# Patient Record
Sex: FEMALE | Race: WHITE | Employment: FULL TIME | URBAN - METROPOLITAN AREA
[De-identification: names, ages, dates, MRNs, and addresses within clinical notes are randomized per-mention and may not be internally consistent; named-entity substitution may affect disease eponyms.]

---

## 2018-08-07 ENCOUNTER — OFFICE VISIT (OUTPATIENT)
Dept: OBGYN CLINIC | Age: 48
End: 2018-08-07

## 2018-08-07 VITALS
SYSTOLIC BLOOD PRESSURE: 102 MMHG | BODY MASS INDEX: 29.47 KG/M2 | HEIGHT: 64 IN | WEIGHT: 172.6 LBS | DIASTOLIC BLOOD PRESSURE: 64 MMHG

## 2018-08-07 DIAGNOSIS — L90.0 LICHEN SCLEROSUS: ICD-10-CM

## 2018-08-07 DIAGNOSIS — Z12.31 SCREENING MAMMOGRAM, ENCOUNTER FOR: ICD-10-CM

## 2018-08-07 DIAGNOSIS — Z11.51 SCREENING FOR HUMAN PAPILLOMAVIRUS: ICD-10-CM

## 2018-08-07 DIAGNOSIS — Z01.419 WELL WOMAN EXAM: Primary | ICD-10-CM

## 2018-08-07 RX ORDER — CITALOPRAM 20 MG/1
TABLET, FILM COATED ORAL
COMMUNITY
Start: 2018-05-24 | End: 2018-09-04 | Stop reason: SDUPTHER

## 2018-08-07 NOTE — PROGRESS NOTES
Annual exam    Keyona Tilley is a 52 y.o.  A1 presenting for annual exam. Her main concerns today include dyspareunia and a history of lichen sclerosis (clinical diagnosis, never biopsied, previously followed by Grazyna Blackwell MD). Pt reports symptoms were not improved with clobetasol or topical estrogen. Improving on its own now with dietary changes she has made (due to gluten intolerance). Previously on OCP's for heavy menses. Began to have mid-cycle BTB, thus she stopped her OCPs 6 months ago, for the last couple of months her menstrual cycles have been normal with no BTB. Does not wish to restart at this time. Ob/Gyn Hx:   A1 - 2 c-sections,  &   LMP- 18  Menses- regular monthly cycles? yes, last 5 days, passage of clots? yes, intermenstrual spotting? yes, previously, not in the last 2 months Number of pads/tampons per day? 5  Contraception- withdrawal methods, condoms occasionally  STI- denies  ? SA- yes    Health maintenance:  Pap- 10/2016, normal per patient, Dr Grazyna Blackwell, denies h/o abnl aps  Mammo- 14 B1  Colonoscopy- not indicated  Gardasil- outside of recommended age    Past Medical History:   Diagnosis Date    Lichen sclerosus        Past Surgical History:   Procedure Laterality Date    HX  SECTION  ,        Family History   Problem Relation Age of Onset    No Known Problems Mother     Diabetes Father     Hypertension Father     Breast Cancer Paternal Aunt      early 52's, 2 occurances    Colon Cancer Maternal Grandmother     Colon Cancer Maternal Grandfather        Social History     Social History    Marital status: UNKNOWN     Spouse name: N/A    Number of children: N/A    Years of education: N/A     Occupational History    Not on file.      Social History Main Topics    Smoking status: Former Smoker    Smokeless tobacco: Never Used      Comment: quit 20 years ago    Alcohol use Yes      Comment: 4 drinks per week    Drug use: No    Sexual activity: Yes     Partners: Male     Birth control/ protection: Condom, None     Other Topics Concern    Not on file     Social History Narrative    No narrative on file       Current Outpatient Prescriptions   Medication Sig Dispense Refill    citalopram (CELEXA) 20 mg tablet          Allergies   Allergen Reactions    Codeine Nausea and Vomiting       Review of Systems - History obtained from the patient  Constitutional: negative for weight loss, fever, night sweats  HEENT: negative for hearing loss, earache, congestion, snoring, sorethroat  CV: negative for chest pain, palpitations, edema  Resp: negative for cough, shortness of breath, wheezing  GI: negative for change in bowel habits, abdominal pain, black or bloody stools  : negative for frequency, dysuria, hematuria, vaginal discharge, +dyspareunia  MSK: negative for back pain, joint pain, muscle pain  Breast: negative for breast lumps, nipple discharge, galactorrhea  Skin :negative for itching, rash, hives  Neuro: negative for dizziness, headache, confusion, weakness  Psych: negative for anxiety, depression, change in mood  Heme/lymph: negative for bleeding, bruising, pallor    Physical Exam    Visit Vitals    /64 (BP 1 Location: Left arm, BP Patient Position: Sitting)    Ht 5' 3.5\" (1.613 m)    Wt 172 lb 9.6 oz (78.3 kg)    LMP 07/20/2018 (Exact Date)    BMI 30.1 kg/m2       Constitutional  · Appearance: well-nourished, well developed, alert, in no acute distress    HENT  · Head and Face: appears normal    Neck  · Inspection/Palpation: normal appearance, no masses or tenderness  · Lymph Nodes: no lymphadenopathy present  · Thyroid: gland size normal, nontender, no nodules or masses present on palpation    Chest  · Respiratory Effort: non-labored breathing  · Auscultation: CTAB, normal breath sounds    Cardiovascular  · Heart:  · Auscultation: regular rate and rhythm without murmur  · Extremities: no peripheral edema    Breasts  · Inspection of Breasts: breasts symmetrical, no skin changes, no discharge present, nipple appearance normal, no skin retraction present  · Palpation of Breasts and Axillae: no masses present on palpation, no breast tenderness  · Axillary Lymph Nodes: no lymphadenopathy present    Gastrointestinal  · Abdominal Examination: abdomen non-tender to palpation, normal bowel sounds, no masses present  · Liver and spleen: no hepatomegaly present, spleen not palpable  · Hernias: no hernias identified    Genitourinary  · External Genitalia: normal appearance for age, no discharge present, no tenderness present, no inflammatory lesions present, no masses present, +mild atrophy present, +diffuse hypopigmentation/erythema around vulva, perineum, and hemanth-anal area consistent with lichen sclerosus, no discrete lesions           · Vagina: normal vaginal vault without central or paravaginal defects, no discharge present, no inflammatory lesions present, no masses present  · Bladder: non-tender to palpation  · Urethra: appears normal  · Cervix: normal   · Uterus: ? Slightly enlarged, possible fibroid vs. Adnexal mass  · Adnexa: no adnexal tenderness present  · Perineum: perineum within normal limits, no evidence of trauma, as above    Skin  · General Inspection: no rash, no lesions identified    Neurologic/Psychiatric  · Mental Status:  · Orientation: grossly oriented to person, place and time  · Mood and Affect: mood normal, affect appropriate      Assessment/Plan:  52 y.o. Bevely Case presenting for annual exam. Overall doing well.      Health Maintenance:  -counseled re: diet, exercise, healthy lifestyle  -pap/HPV today  -declines STI testing  -refer for mammo  -declines hormonal contraception for cycle control as prior BTB on OCPs   -advised pt keep menstrual calendar and return for further evaluation for any further AUB --> reports prior US with Dr. Cassandra Aquino wnl, has never had EMB    Lichen sclerosus: clinical appearance classic for lichen sclerosus, however, not previously improved with topical steroids or estrogen  -discussed low threshold to biopsy any suspicious areas in the future  -for now would like to continue with dietary changes, as she has noted improvement with these modifications    RTC: 1 year for AE or sooner nicole Mock MD  8/7/2018  3:20 PM

## 2018-08-07 NOTE — PATIENT INSTRUCTIONS
Pap Test: Care Instructions  Your Care Instructions    The Pap test (also called a Pap smear) is a screening test for cancer of the cervix, which is the lower part of the uterus that opens into the vagina. The test can help your doctor find early changes in the cells that could lead to cancer. The sample of cells taken during your test has been sent to a lab so that an expert can look at the cells. It usually takes a week or two to get the results back. Follow-up care is a key part of your treatment and safety. Be sure to make and go to all appointments, and call your doctor if you are having problems. It's also a good idea to know your test results and keep a list of the medicines you take. What do the results mean? · A normal result means that the test did not find any abnormal cells in the sample. · An abnormal result can mean many things. Most of these are not cancer. The results of your test may be abnormal because:  ¨ You have an infection of the vagina or cervix, such as a yeast infection. ¨ You have an IUD (intrauterine device for birth control). ¨ You have low estrogen levels after menopause that are causing the cells to change. ¨ You have cell changes that may be a sign of precancer or cancer. The results are ranked based on how serious the changes might be. There are many other reasons why you might not get a normal result. If the results were abnormal, you may need to get another test within a few weeks or months. If the results show changes that could be a sign of cancer, you may need a test called a colposcopy, which provides a more complete view of the cervix. Sometimes the lab cannot use the sample because it does not contain enough cells or was not preserved well. If so, you may need to have the test again. This is not common, but it does happen from time to time. When should you call for help?   Watch closely for changes in your health, and be sure to contact your doctor if:    · You have vaginal bleeding or pain for more than 2 days after the test. It is normal to have a small amount of bleeding for a day or two after the test.   Where can you learn more? Go to http://alberto-kenney.info/. Enter C553 in the search box to learn more about \"Pap Test: Care Instructions. \"  Current as of: May 12, 2017  Content Version: 11.7  © 3519-7115 [employer]. Care instructions adapted under license by [employer] (which disclaims liability or warranty for this information). If you have questions about a medical condition or this instruction, always ask your healthcare professional. [address] any warranty or liability for your use of this information.

## 2018-08-10 LAB
CYTOLOGIST CVX/VAG CYTO: NORMAL
CYTOLOGY CVX/VAG DOC THIN PREP: NORMAL
DX ICD CODE: NORMAL
HPV I/H RISK 4 DNA CVX QL PROBE+SIG AMP: NEGATIVE
Lab: NORMAL
OTHER STN SPEC: NORMAL
PATH REPORT.FINAL DX SPEC: NORMAL
STAT OF ADQ CVX/VAG CYTO-IMP: NORMAL

## 2018-09-04 ENCOUNTER — OFFICE VISIT (OUTPATIENT)
Dept: OBGYN CLINIC | Age: 48
End: 2018-09-04

## 2018-09-04 VITALS
SYSTOLIC BLOOD PRESSURE: 110 MMHG | RESPIRATION RATE: 18 BRPM | DIASTOLIC BLOOD PRESSURE: 80 MMHG | HEIGHT: 64 IN | BODY MASS INDEX: 28.17 KG/M2 | WEIGHT: 165 LBS

## 2018-09-04 DIAGNOSIS — F32.A DEPRESSION, UNSPECIFIED DEPRESSION TYPE: ICD-10-CM

## 2018-09-04 DIAGNOSIS — R19.00 PELVIC MASS IN FEMALE: Primary | ICD-10-CM

## 2018-09-04 DIAGNOSIS — D25.9 UTERINE LEIOMYOMA, UNSPECIFIED LOCATION: ICD-10-CM

## 2018-09-04 RX ORDER — CITALOPRAM 20 MG/1
20 TABLET, FILM COATED ORAL DAILY
Qty: 90 TAB | Refills: 3 | Status: SHIPPED | OUTPATIENT
Start: 2018-09-04 | End: 2018-12-03

## 2018-09-04 NOTE — PROGRESS NOTES
Annual exam    Brooklynn Young is a 52 y.o.  A1 presenting for follow up of pelvic mass appreciated on recent annual exam. 5cm posterior right lateral fibroid seen on ultrasound today. +dyspareunia and a history of lichen sclerosis (clinical diagnosis, never biopsied, previously followed by Alyssa Reich MD). Pt reports symptoms were not improved with clobetasol or topical estrogen. Improving on its own now with dietary changes she has made (due to gluten intolerance). Previously on OCP's for heavy menses. Began to have mid-cycle BTB, thus she stopped her OCPs 6 months ago, for the last couple of months her menstrual cycles have been normal with no BTB. Does not wish to restart at this time. Pt also requesting refill of citalopram.     Ultrasound 18  THE UTERUS IS ANTEVERTED, NORMAL IN SIZE AND ECHOGENICITY. THERE APPEARS TO BE A RT LAT  INTRAMURAL FIBROID SEEN MEASURING 54 X 47MM. THE ENDOMETRIUM MEASURES 9MM IN THICKNESS. NO MASSES OR ABNORMALITIES ARE SEEN. BILATERAL OVARIES NOT SEEN DUE TO BOWEL GAS. BILATERAL ADNEXAS APPEAR WNL. NO FREE FLUID IS SEEN IN THE CDS. Ob/Gyn Hx:   A1 - 2 c-sections,  &   LMP- 18  Menses- regular monthly cycles? yes, last 5 days, passage of clots? yes, intermenstrual spotting? yes, previously, not in the last 2 months Number of pads/tampons per day? 5  Contraception- withdrawal methods, condoms occasionally  STI- denies  ? SA- yes    Health maintenance:  Pap- 10/2016, normal per patient, Dr Alyssa Reich, denies h/o abnl aps  Mammo- 14 B1  Colonoscopy- not indicated  Gardasil- outside of recommended age    Past Medical History:   Diagnosis Date    Depression     Gluten intolerance     Lichen sclerosus        Past Surgical History:   Procedure Laterality Date    HX  SECTION  ,        Family History   Problem Relation Age of Onset    No Known Problems Mother     Diabetes Father     Hypertension Father     Breast Cancer Paternal Aunt      early 52's, 2 occurances    Colon Cancer Maternal Grandmother     Colon Cancer Maternal Grandfather        Social History     Social History    Marital status:      Spouse name: N/A    Number of children: N/A    Years of education: N/A     Occupational History    Not on file. Social History Main Topics    Smoking status: Former Smoker    Smokeless tobacco: Never Used      Comment: quit 20 years ago    Alcohol use Yes      Comment: 4 drinks per week    Drug use: No    Sexual activity: Yes     Partners: Male     Birth control/ protection: Condom, None     Other Topics Concern    Not on file     Social History Narrative       Current Outpatient Prescriptions   Medication Sig Dispense Refill    citalopram (CELEXA) 20 mg tablet Take 1 Tab by mouth daily for 90 days.  90 Tab 3       Allergies   Allergen Reactions    Codeine Nausea and Vomiting       Review of Systems - History obtained from the patient  Constitutional: negative for weight loss, fever, night sweats  HEENT: negative for hearing loss, earache, congestion, snoring, sorethroat  CV: negative for chest pain, palpitations, edema  Resp: negative for cough, shortness of breath, wheezing  GI: negative for change in bowel habits, abdominal pain, black or bloody stools  : negative for frequency, dysuria, hematuria, vaginal discharge, +dyspareunia  MSK: negative for back pain, joint pain, muscle pain  Breast: negative for breast lumps, nipple discharge, galactorrhea  Skin :negative for itching, rash, hives  Neuro: negative for dizziness, headache, confusion, weakness  Psych: negative for anxiety, depression, change in mood  Heme/lymph: negative for bleeding, bruising, pallor    Physical Exam    Visit Vitals    /80    Resp 18    Ht 5' 3.5\" (1.613 m)    Wt 165 lb (74.8 kg)    BMI 28.77 kg/m2       Constitutional  · Appearance: well-nourished, well developed, alert, in no acute distress    HENT  · Head and Face: appears normal    Chest  · Respiratory Effort: non-labored breathing    Cardiovascular  · Extremities: no peripheral edema    Gastrointestinal  · Abdominal Examination: abdomen non-tender to palpation, normal bowel sounds, no masses present  · Liver and spleen: no hepatomegaly present, spleen not palpable  · Hernias: no hernias identified    Genitourinary: deferred today, as examined at recent visit (exam findings below from prior visit)  · External Genitalia: normal appearance for age, no discharge present, no tenderness present, no inflammatory lesions present, no masses present, +mild atrophy present, +diffuse hypopigmentation/erythema around vulva, perineum, and hemanth-anal area consistent with lichen sclerosus, no discrete lesions           · Vagina: normal vaginal vault without central or paravaginal defects, no discharge present, no inflammatory lesions present, no masses present  · Bladder: non-tender to palpation  · Urethra: appears normal  · Cervix: normal   · Uterus: ? Slightly enlarged, possible fibroid vs. Adnexal mass  · Adnexa: no adnexal tenderness present  · Perineum: perineum within normal limits, no evidence of trauma, as above    Skin  · General Inspection: no rash, no lesions identified    Neurologic/Psychiatric  · Mental Status:  · Orientation: grossly oriented to person, place and time  · Mood and Affect: mood normal, affect appropriate      Assessment/Plan:  52 y.o. Waneta Phoenix presenting for follow up of pelvic mass. 5cm posterior right lateral fibroid on ultrasound today. No adnexal masses visualized. Pt also with h/o suspected lichen sclerosus (never biopsied, clinically diagnosed).     -reviewed ultrasound findings with patient today, reassurance provided that likely benign fibroid  -declines hormonal contraception for cycle control, but will follow up if continues to have heavy menses  -advised pt keep menstrual calendar and return for further evaluation for any further AUB   -refill of citalopram provided for depression    RTC: 1 year for AE or sooner nicole Driver MD  9/4/2018  3:20 PM

## 2018-09-04 NOTE — PROGRESS NOTES
Heidy Moffett is a 52 y.o. female      Chief Complaint   Patient presents with    Ultrasound       1. Have you been to the ER, urgent care clinic since your last visit? Hospitalized since your last visit? No     2. Have you seen or consulted any other health care providers outside of the [address] [place] [address] since your last visit? Include any pap smears or colon screening.   No     Visit Vitals    /80    Resp 18    Ht 5' 3.5\" (1.613 m)    Wt 165 lb (74.8 kg)    BMI 28.77 kg/m2

## 2019-09-11 ENCOUNTER — TELEPHONE (OUTPATIENT)
Dept: OBGYN CLINIC | Age: 49
End: 2019-09-11

## 2019-09-11 NOTE — TELEPHONE ENCOUNTER
Patient of SP- left message on voicemail that she is asking for a refill of Citalopram 20 mg and it was declined. She thinks it is because the rx  due to not using all the refills.

## 2019-09-11 NOTE — TELEPHONE ENCOUNTER
09/11/19 - left message for patient that the rx was declined due to being out of date for recent AE-  Needs to schedule and then we can ask for possible refill to get her through.

## 2019-09-12 NOTE — TELEPHONE ENCOUNTER
Patient called, left DIANNA on [facility] Refill line requesting a refill of her celexa. 10:10am - L/M alerting patient that she needs to schedule an AE before any refills can be sent in.

## 2019-09-16 RX ORDER — CITALOPRAM 20 MG/1
20 TABLET, FILM COATED ORAL DAILY
Qty: 30 TAB | Refills: 1 | Status: SHIPPED | OUTPATIENT
Start: 2019-09-16 | End: 2020-03-10

## 2019-09-16 NOTE — TELEPHONE ENCOUNTER
Yes, okay to send in refill for Celexa (Citalopram) 20 mg 1 tab daily #30 with 1 refill to get her until her annual appointment on 10/21 with SP. If she does not show for her annual appointment, we will be unable to send her in further refills until we see her.

## 2019-09-16 NOTE — TELEPHONE ENCOUNTER
Patient called back and said that she has scheduled for a visit for annual with SP on 10/21/19 at 3 pm.      Okay to send in one month supply of Celexa (Citalopram) 20 mg 1 tab daily #30 no refills since SP is out of office today?

## 2019-09-16 NOTE — TELEPHONE ENCOUNTER
rx has been sent and confirmed receipt.   Patient is aware no more refills available until AE per Dr. Ivette Mendez

## 2019-10-21 ENCOUNTER — OFFICE VISIT (OUTPATIENT)
Dept: OBGYN CLINIC | Age: 49
End: 2019-10-21

## 2019-10-21 VITALS
BODY MASS INDEX: 28.34 KG/M2 | WEIGHT: 166 LBS | DIASTOLIC BLOOD PRESSURE: 70 MMHG | HEIGHT: 64 IN | SYSTOLIC BLOOD PRESSURE: 112 MMHG

## 2019-10-21 DIAGNOSIS — L90.0 LICHEN SCLEROSUS: Primary | ICD-10-CM

## 2019-10-21 DIAGNOSIS — N95.1 PERIMENOPAUSAL: ICD-10-CM

## 2019-10-21 DIAGNOSIS — N94.89 ADNEXAL MASS: ICD-10-CM

## 2019-10-21 DIAGNOSIS — Z01.411 ENCOUNTER FOR GYNECOLOGICAL EXAMINATION WITH ABNORMAL FINDING: ICD-10-CM

## 2019-10-21 NOTE — PATIENT INSTRUCTIONS

## 2019-10-21 NOTE — PROGRESS NOTES
Annual exam    Dirk Almendarez is a 52 y.o.  A1 presenting for annual exam. Pt believes she is perimenopausal, she had menstrual cycle in 2018, then no menses x 6 months, then had 3 months of normal cycles in the spring, and has not had a menstrual cycle since. She is experiencing hot flashes/night sweats and vaginal dryness. Declines HRT. Managing with symptoms. PMH of lichen sclerosis (clinical diagnosis, never biopsied, previously followed by Valerie Singh MD). Pt reports clobetasol made symptoms worse and topical estrogen did not help in the past. She has noted some improvement with dietary changes she has made (cutting out gluten and almonds and trying to cut out dairy). 2 teenage children, son graduated HS and wants to join [employer], daughter in 10th grade. Ob/Gyn Hx:   A1 - 2 c-sections,  &   LMP- irregular  Menses- irregular for the last year  Contraception- withdrawal methods, condoms occasionally  STI- denies  ? SA- yes    Health maintenance:  Pap- 18 NILM HPV Neg, 10/2016 normal per patient, Dr Valerie Singh, denies h/o abnl aps  Mammo-  normal per patient  Colonoscopy- not indicated  Dexa- does report family h/o osteoporosis and chiropractor did xray that was concerning for low bone density (per pt) --> advised she discuss with PCP about possibility of early dexa scan    Past Medical History:   Diagnosis Date    Depression     Gluten intolerance     Lichen sclerosus        Past Surgical History:   Procedure Laterality Date    HX  SECTION  ,        Family History   Problem Relation Age of Onset    No Known Problems Mother     Diabetes Father     Hypertension Father     Breast Cancer Paternal Aunt         early 52's, 2 occurances    Colon Cancer Maternal Grandmother     Colon Cancer Maternal Grandfather        Social History     Socioeconomic History    Marital status:      Spouse name: Not on file    Number of children: Not on file    Years of education: Not on file    Highest education level: Not on file   Occupational History    Not on file   Social Needs    Financial resource strain: Not on file    Food insecurity:     Worry: Not on file     Inability: Not on file    Transportation needs:     Medical: Not on file     Non-medical: Not on file   Tobacco Use    Smoking status: Former Smoker    Smokeless tobacco: Never Used    Tobacco comment: quit 20 years ago   Substance and Sexual Activity    Alcohol use: Yes     Comment: 4 drinks per week    Drug use: No    Sexual activity: Yes     Partners: Male     Birth control/protection: Condom, None   Lifestyle    Physical activity:     Days per week: Not on file     Minutes per session: Not on file    Stress: Not on file   Relationships    Social connections:     Talks on phone: Not on file     Gets together: Not on file     Attends Anglican service: Not on file     Active member of club or organization: Not on file     Attends meetings of clubs or organizations: Not on file     Relationship status: Not on file    Intimate partner violence:     Fear of current or ex partner: Not on file     Emotionally abused: Not on file     Physically abused: Not on file     Forced sexual activity: Not on file   Other Topics Concern    Not on file   Social History Narrative    Not on file       Current Outpatient Medications   Medication Sig Dispense Refill    citalopram (CELEXA) 20 mg tablet Take 1 Tab by mouth daily.  30 Tab 1       Allergies   Allergen Reactions    Codeine Nausea and Vomiting       Review of Systems - History obtained from the patient  Constitutional: negative for weight loss, fever, night sweats  HEENT: negative for hearing loss, earache, congestion, snoring, sorethroat  CV: negative for chest pain, palpitations, edema  Resp: negative for cough, shortness of breath, wheezing  GI: negative for change in bowel habits, abdominal pain, black or bloody stools  : negative for frequency, dysuria, hematuria, vaginal discharge, +irregular menses, vaginal dryness, hot flashes  MSK: negative for back pain, joint pain, muscle pain  Breast: negative for breast lumps, nipple discharge, galactorrhea  Skin: negative for itching, rash, hives  Neuro: negative for dizziness, headache, confusion, weakness  Psych: negative for anxiety, depression, change in mood  Heme/lymph: negative for bleeding, bruising, pallor    Physical Exam  Visit Vitals  Ht 5' 3.5\" (1.613 m)   Wt 166 lb (75.3 kg)   BMI 28.94 kg/m²     Constitutional  · Appearance: well-nourished, well developed, alert, in no acute distress    HENT  · Head and Face: appears normal    Neck  · Inspection/Palpation: normal appearance, no masses or tenderness  · Lymph Nodes: no lymphadenopathy present  · Thyroid: gland size normal, nontender, no nodules or masses present on palpation    Chest  · Respiratory Effort: non-labored breathing  · Auscultation: CTAB, normal breath sounds    Cardiovascular  · Heart:  · Auscultation: regular rate and rhythm without murmur  · Extremities: no peripheral edema    Breasts  · Inspection of Breasts: breasts symmetrical, no skin changes, no discharge present, nipple appearance normal, no skin retraction present  · Palpation of Breasts and Axillae: no masses present on palpation, no breast tenderness  · Axillary Lymph Nodes: no lymphadenopathy present    Gastrointestinal  · Abdominal Examination: abdomen non-tender to palpation, normal bowel sounds, no masses present  · Liver and spleen: no hepatomegaly present, spleen not palpable  · Hernias: no hernias identified    Genitourinary  · External Genitalia: normal appearance for age, no discharge present, no tenderness present, no inflammatory lesions present, no masses present, +mild atrophy present, +diffuse very mild hypopigmentation/erythema around vulva, perineum, and hemanth-anal area consistent with lichen sclerosus, no discrete lesions, improved compared with prior exam, less erythema          · Vagina: normal vaginal vault without central or paravaginal defects, no discharge present, no inflammatory lesions present, no masses present  · Bladder: non-tender to palpation  · Urethra: appears normal  · Cervix: normal   · Uterus: ? Slightly enlarged, possible fibroid vs. Right Adnexal mass  · Adnexa: no adnexal tenderness present  · Perineum: perineum within normal limits, no evidence of trauma, as above    Skin  · General Inspection: no rash, no lesions identified    Neurologic/Psychiatric  · Mental Status:  · Orientation: grossly oriented to person, place and time  · Mood and Affect: mood normal, affect appropriate      Assessment/Plan:  52 y.o. Linda Castro presenting for annual exam. Overall doing well. +perimenopausal, +VMS, +vaginal dryness, +h/o lichen sclerosus (stable), +?fibroid vs. Right adnexal mass (unchanged).      Health Maintenance:  -diet, exercise, healthy lifestyle  -pap/HPV next 2023  -declines STI testing  -refer for mammo  -colonoscopy next year  -declines HRT  -advised pt keep menstrual calendar  -advised f/u with PCP to discuss early dexa    Lichen sclerosus: clinical appearance classic for lichen sclerosus, however, not previously improved with topical steroids or estrogen, currently stable, no lesions requiring biopsy  -discussed low threshold to biopsy any suspicious areas in the future, understands increased risks of vulvar malignancy with this medication  -for now would like to continue with dietary changes, as she has noted improvement with these modifications    ?fibroid vs. Adnexal mass on exam:  -referral for TVUS provided    RTC: 2 wks for US, 1 year for AE or sooner prn    Daryl Anderson MD  10/21/2019  4:42 PM

## 2019-11-12 ENCOUNTER — OFFICE VISIT (OUTPATIENT)
Dept: OBGYN CLINIC | Age: 49
End: 2019-11-12

## 2019-11-12 VITALS
WEIGHT: 167 LBS | BODY MASS INDEX: 28.51 KG/M2 | SYSTOLIC BLOOD PRESSURE: 114 MMHG | DIASTOLIC BLOOD PRESSURE: 72 MMHG | HEIGHT: 64 IN

## 2019-11-12 DIAGNOSIS — D21.9 FIBROIDS: Primary | ICD-10-CM

## 2019-11-12 DIAGNOSIS — L90.0 LICHEN SCLEROSUS: ICD-10-CM

## 2019-11-12 DIAGNOSIS — N95.2 VAGINAL ATROPHY: ICD-10-CM

## 2019-11-12 RX ORDER — ESTRADIOL 0.1 MG/G
CREAM VAGINAL
Qty: 42.5 G | Refills: 5 | Status: SHIPPED | OUTPATIENT
Start: 2019-11-12

## 2019-11-12 NOTE — PROGRESS NOTES
Follow Up    Elis Monique is a 52 y.o.  A1 presenting for ultrasound and follow up of ?fibroid vs. Adnexal mass on exam. Ultrasound today showing persistent fibroid, no appreciable adnexal masses. Pt believes she is perimenopausal, she had menstrual cycle in 2018, then no menses x 6 months, then had 3 months of normal cycles in the spring, and has not had a menstrual cycle since. She is experiencing hot flashes/night sweats and vaginal dryness. Declines systemic HRT but interested in topical vaginal estrogen. PMH of lichen sclerosis (clinical diagnosis, never biopsied, previously followed by David Hummel MD). Pt reports clobetasol made symptoms worse and topical estrogen did not help in the past. She has noted some improvement with dietary changes she has made (cutting out gluten and almonds and trying to cut out dairy). 2 teenage children, son graduated HS and wants to join [employer], daughter in 10th grade. TV ULTRASOUND 19  THE UTERUS IS ANTEVERTED, ENLARGED IN SIZE AND HETEROGENOUS IN ECHOGENICITY. THERE APPEARS  TO BE A FIBROID SEEN POSTERIOR ML SUBMUCOSAL VS. INTRAMURAL MEASURING 68 X 46 MM AND LT LAT  SUBSEROSAL MEASURING 19 X 19 X 16 MM. THE ENDOMETRIUM MEASURES 13MM IN THICKNESS. NO MASSES OR ABNORMALITIES ARE SEEN. BILATERAL OVARIES NOT SEEN DUE TO BOWEL GAS. BILATERAL ADNEXAS APPEAR WNL. NO FREE FLUID IS SEEN IN THE CDS. Ob/Gyn Hx:   A1 - 2 c-sections,  &   LMP- irregular  Menses- irregular for the last year  Contraception- withdrawal methods, condoms occasionally  STI- denies  ? SA- yes    Health maintenance:  Pap- 18 NILM HPV Neg, 10/2016 normal per patient, Dr David Hummel, denies h/o abnl aps  Mammo- 2019 normal per patient  Colonoscopy- not indicated  Dexa- does report family h/o osteoporosis and chiropractor did xray that was concerning for low bone density (per pt) --> advised she discuss with PCP about possibility of early dexa scan    Past Medical History:   Diagnosis Date    Depression     Gluten intolerance     Lichen sclerosus        Past Surgical History:   Procedure Laterality Date    HX  SECTION  ,        Family History   Problem Relation Age of Onset    No Known Problems Mother     Diabetes Father     Hypertension Father     Breast Cancer Paternal Aunt         early 52's, 2 occurances    Colon Cancer Maternal Grandmother     Colon Cancer Maternal Grandfather        Social History     Socioeconomic History    Marital status:      Spouse name: Not on file    Number of children: Not on file    Years of education: Not on file    Highest education level: Not on file   Occupational History    Not on file   Social Needs    Financial resource strain: Not on file    Food insecurity:     Worry: Not on file     Inability: Not on file    Transportation needs:     Medical: Not on file     Non-medical: Not on file   Tobacco Use    Smoking status: Former Smoker    Smokeless tobacco: Never Used    Tobacco comment: quit 20 years ago   Substance and Sexual Activity    Alcohol use: Yes     Comment: 4 drinks per week    Drug use: No    Sexual activity: Yes     Partners: Male     Birth control/protection: None   Lifestyle    Physical activity:     Days per week: Not on file     Minutes per session: Not on file    Stress: Not on file   Relationships    Social connections:     Talks on phone: Not on file     Gets together: Not on file     Attends Restorationism service: Not on file     Active member of club or organization: Not on file     Attends meetings of clubs or organizations: Not on file     Relationship status: Not on file    Intimate partner violence:     Fear of current or ex partner: Not on file     Emotionally abused: Not on file     Physically abused: Not on file     Forced sexual activity: Not on file   Other Topics Concern    Not on file   Social History Narrative    Not on file       Current Outpatient Medications   Medication Sig Dispense Refill    citalopram (CELEXA) 20 mg tablet Take 1 Tab by mouth daily.  30 Tab 1       Allergies   Allergen Reactions    Codeine Nausea and Vomiting       Review of Systems - History obtained from the patient  Constitutional: negative for weight loss, fever, night sweats  HEENT: negative for hearing loss, earache, congestion, snoring, sorethroat  CV: negative for chest pain, palpitations, edema  Resp: negative for cough, shortness of breath, wheezing  GI: negative for change in bowel habits, abdominal pain, black or bloody stools  : negative for frequency, dysuria, hematuria, vaginal discharge, +irregular menses, vaginal dryness, hot flashes  MSK: negative for back pain, joint pain, muscle pain  Breast: negative for breast lumps, nipple discharge, galactorrhea  Skin: negative for itching, rash, hives  Neuro: negative for dizziness, headache, confusion, weakness  Psych: negative for anxiety, depression, change in mood  Heme/lymph: negative for bleeding, bruising, pallor    Physical Exam  Visit Vitals  /72 (BP 1 Location: Right arm, BP Patient Position: Sitting)   Ht 5' 3.5\" (1.613 m)   Wt 167 lb (75.8 kg)   BMI 29.12 kg/m²     Constitutional  · Appearance: well-nourished, well developed, alert, in no acute distress    HENT  · Head and Face: appears normal    Neck  · Inspection/Palpation: normal appearance, no masses or tenderness  · Lymph Nodes: no lymphadenopathy present  · Thyroid: gland size normal, nontender, no nodules or masses present on palpation    Chest  · Respiratory Effort: non-labored breathing  · Auscultation: CTAB, normal breath sounds    Cardiovascular  · Heart:  · Auscultation: regular rate and rhythm without murmur  · Extremities: no peripheral edema    Gastrointestinal  · Abdominal Examination: abdomen non-tender to palpation, normal bowel sounds, no masses present  · Liver and spleen: no hepatomegaly present, spleen not palpable  · Hernias: no hernias identified    Genitourinary --> exam findings below from prior visit  · External Genitalia: normal appearance for age, no discharge present, no tenderness present, no inflammatory lesions present, no masses present, +mild atrophy present, +diffuse very mild hypopigmentation/erythema around vulva, perineum, and hemanth-anal area consistent with lichen sclerosus, no discrete lesions, improved compared with prior exam, less erythema          · Vagina: normal vaginal vault without central or paravaginal defects, no discharge present, no inflammatory lesions present, no masses present  · Bladder: non-tender to palpation  · Urethra: appears normal  · Cervix: normal   · Uterus: ? Slightly enlarged, possible fibroid vs. Right Adnexal mass  · Adnexa: no adnexal tenderness present  · Perineum: perineum within normal limits, no evidence of trauma, as above    Skin  · General Inspection: no rash, no lesions identified    Neurologic/Psychiatric  · Mental Status:  · Orientation: grossly oriented to person, place and time  · Mood and Affect: mood normal, affect appropriate      Assessment/Plan:  52 y.o. A2 perimenopausal female presenting for follow up of ?fibroid vs. Adnexal mass. Ultrasound today showing persistent ~6cm fibroid. +VMS, +vaginal dryness, +h/o lichen sclerosus (stable).     ?fibroid vs. Adnexal mass on exam:  -reviewed US findings with pt today, reassurance provided, pt asymptomatic, discussed that fibroids often shrink after menopause    Vaginal atrophy:  -trial of topical estrace nightly x 2 wk, then twice weekly for maintenance (Rx provided)    Lichen sclerosus: clinical appearance classic for lichen sclerosus, however, not previously improved with topical steroids or estrogen, currently stable, no lesions requiring biopsy  -discussed low threshold to biopsy any suspicious areas in the future, understands increased risks of vulvar malignancy with this medication  -for now would like to continue with dietary changes, as she has noted improvement with these modifications    RTC: for AE or sooner prn    Joseph Christiansen MD  11/12/2019  10:12 AM

## 2020-03-10 RX ORDER — CITALOPRAM 20 MG/1
TABLET, FILM COATED ORAL
Qty: 30 TAB | Refills: 0 | Status: SHIPPED | OUTPATIENT
Start: 2020-03-10 | End: 2020-05-29

## 2020-05-29 RX ORDER — CITALOPRAM 20 MG/1
TABLET, FILM COATED ORAL
Qty: 30 TAB | Refills: 0 | Status: SHIPPED | OUTPATIENT
Start: 2020-05-29 | End: 2020-09-23

## 2020-09-23 ENCOUNTER — TELEPHONE (OUTPATIENT)
Dept: OBGYN CLINIC | Age: 50
End: 2020-09-23

## 2020-09-23 RX ORDER — CITALOPRAM 20 MG/1
TABLET, FILM COATED ORAL
Qty: 30 TAB | Refills: 0 | Status: SHIPPED | OUTPATIENT
Start: 2020-09-23 | End: 2021-01-22

## 2020-09-23 NOTE — TELEPHONE ENCOUNTER
Patient of SP    Calling to say that her Citalopram 20 mg take 1 tab daily rx was declined refill because she was due her AE    She has scheduled for 10/23/20 with SP    Going out of town, out of rx, needs done asap.         Kate Blanca

## 2020-09-23 NOTE — TELEPHONE ENCOUNTER
rx has been sent for 30 day supply to get her through until next AE. No refills until AE unless cleared by physician or has AE.

## 2020-10-23 ENCOUNTER — OFFICE VISIT (OUTPATIENT)
Dept: OBGYN CLINIC | Age: 50
End: 2020-10-23
Payer: COMMERCIAL

## 2020-10-23 VITALS
SYSTOLIC BLOOD PRESSURE: 120 MMHG | HEIGHT: 64 IN | DIASTOLIC BLOOD PRESSURE: 80 MMHG | WEIGHT: 163 LBS | BODY MASS INDEX: 27.83 KG/M2

## 2020-10-23 DIAGNOSIS — N95.1 MENOPAUSAL SYMPTOMS: ICD-10-CM

## 2020-10-23 DIAGNOSIS — D21.9 FIBROIDS: ICD-10-CM

## 2020-10-23 DIAGNOSIS — Z12.31 SCREENING MAMMOGRAM, ENCOUNTER FOR: Primary | ICD-10-CM

## 2020-10-23 DIAGNOSIS — L90.0 LICHEN SCLEROSUS: ICD-10-CM

## 2020-10-23 DIAGNOSIS — Z01.411 ENCOUNTER FOR GYNECOLOGICAL EXAMINATION WITH ABNORMAL FINDING: ICD-10-CM

## 2020-10-23 PROCEDURE — 99396 PREV VISIT EST AGE 40-64: CPT | Performed by: OBSTETRICS & GYNECOLOGY

## 2020-10-23 NOTE — PROGRESS NOTES
Annual Exam    Aleksandr Abreu is a 48 y.o. perimenopausal female presenting for annual exam.     No menses in > 6 months, cycle shave been irregular for the past couple of years. She was having a lot of hot flashes and night sweats but this abruptly stopped about a month ago. Uses topical estrogen cream vaginally prn. Some mood changes and difficulty losing weight. Reports lichen sclerosus has been minimally symptomatic (clinical diagnosis, never biopsied, previously followed by Tricia Sierra MD). Pt reports clobetasol made symptoms worse in the past. She has noted some improvement with dietary changes she has made (cutting out gluten and almonds and trying to cut out dairy). 2 teenage children, son graduated HS and wants to join [employer], daughter in 11th grade. 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 TV ULTRASOUND 19  THE UTERUS IS ANTEVERTED, ENLARGED IN SIZE AND HETEROGENOUS IN ECHOGENICITY. THERE APPEARS  TO BE A FIBROID SEEN POSTERIOR ML SUBMUCOSAL VS. INTRAMURAL MEASURING 68 X 46 MM AND LT LAT  SUBSEROSAL MEASURING 19 X 19 X 16 MM. THE ENDOMETRIUM MEASURES 13MM IN THICKNESS. NO MASSES OR ABNORMALITIES ARE SEEN. BILATERAL OVARIES NOT SEEN DUE TO BOWEL GAS. BILATERAL ADNEXAS APPEAR WNL. NO FREE FLUID IS SEEN IN THE CDS. Ob/Gyn Hx:   A1 - 2 c-sections,  &   LMP- > 6 months ago  Menses- irregular for the last year  Contraception- withdrawal/condoms  STI- denies  ? SA- yes    Health maintenance:  Pap- 18 NILM HPV Neg, 10/2016 normal per patient, Dr Tricia Sierra, denies h/o abnl aps  Mammo- 2019 normal per patient  Colonoscopy- denies  Dexa- does report family h/o osteoporosis and chiropractor did xray that was concerning for low bone density (per pt) --> advised she discuss with PCP about possibility of early dexa scan    Past Medical History:   Diagnosis Date    Depression     Gluten intolerance     Lichen sclerosus        Past Surgical History:   Procedure Laterality Date    HX  SECTION  2003       Family History   Problem Relation Age of Onset    No Known Problems Mother     Diabetes Father     Hypertension Father     Breast Cancer Paternal Aunt         early 52's, 2 occurances    Colon Cancer Maternal Grandmother     Colon Cancer Maternal Grandfather        Social History     Socioeconomic History    Marital status:      Spouse name: Not on file    Number of children: Not on file    Years of education: Not on file    Highest education level: Not on file   Occupational History    Not on file   Social Needs    Financial resource strain: Not on file    Food insecurity     Worry: Not on file     Inability: Not on file    Transportation needs     Medical: Not on file     Non-medical: Not on file   Tobacco Use    Smoking status: Former Smoker    Smokeless tobacco: Never Used    Tobacco comment: quit 20 years ago   Substance and Sexual Activity    Alcohol use: Yes     Comment: 4 drinks per week    Drug use: No    Sexual activity: Yes     Partners: Male     Birth control/protection: None   Lifestyle    Physical activity     Days per week: Not on file     Minutes per session: Not on file    Stress: Not on file   Relationships    Social connections     Talks on phone: Not on file     Gets together: Not on file     Attends Jainism service: Not on file     Active member of club or organization: Not on file     Attends meetings of clubs or organizations: Not on file     Relationship status: Not on file    Intimate partner violence     Fear of current or ex partner: Not on file     Emotionally abused: Not on file     Physically abused: Not on file     Forced sexual activity: Not on file   Other Topics Concern    Not on file   Social History Narrative    Not on file       Current Outpatient Medications   Medication Sig Dispense Refill    citalopram (CELEXA) 20 mg tablet TAKE ONE TABLET BY MOUTH DAILY 30 Tab 0  estradiol (ESTRACE) 0.01 % (0.1 mg/gram) vaginal cream Apply pea-sized amount vaginally nightly x 2 weeks then twice weekly for maintenance 42.5 g 5       Allergies   Allergen Reactions    Codeine Nausea and Vomiting       Review of Systems - History obtained from the patient  Constitutional: negative for weight loss, fever, night sweats  HEENT: negative for hearing loss, earache, congestion, snoring, sorethroat  CV: negative for chest pain, palpitations, edema  Resp: negative for cough, shortness of breath, wheezing  GI: negative for change in bowel habits, abdominal pain, black or bloody stools  : negative for frequency, dysuria, hematuria, vaginal discharge, +irregular menses  MSK: negative for back pain, joint pain, muscle pain  Breast: negative for breast lumps, nipple discharge, galactorrhea  Skin: negative for itching, rash, hives  Neuro: negative for dizziness, headache, confusion, weakness  Psych: negative for anxiety, depression, change in mood  Heme/lymph: negative for bleeding, bruising, pallor    Physical Exam  Visit Vitals  /80 (BP 1 Location: Left arm, BP Patient Position: Sitting)   Ht 5' 3.5\" (1.613 m)   Wt 163 lb (73.9 kg)   BMI 28.42 kg/m²     Constitutional  · Appearance: well-nourished, well developed, alert, in no acute distress    HENT  · Head and Face: appears normal    Neck  · Inspection/Palpation: normal appearance, no masses or tenderness  · Lymph Nodes: no lymphadenopathy present  · Thyroid: gland size normal, nontender, no nodules or masses present on palpation    Chest  · Respiratory Effort: non-labored breathing  · Auscultation: CTAB, normal breath sounds    Cardiovascular  · Heart:  · Auscultation: regular rate and rhythm without murmur  · Extremities: no peripheral edema    Gastrointestinal  · Abdominal Examination: abdomen non-tender to palpation, normal bowel sounds, no masses present  · Liver and spleen: no hepatomegaly present, spleen not palpable  · Hernias: no hernias identified    Genitourinary --> exam findings below from prior visit  · External Genitalia: normal appearance for age, no discharge present, no tenderness present, no inflammatory lesions present, no masses present, +mild atrophy present, +diffuse very mild hypopigmentation/erythema around vulva, perineum, and hemanth-anal area consistent with lichen sclerosus, no discrete lesions, improved compared with prior exam, less erythema          · Vagina: normal vaginal vault without central or paravaginal defects, no discharge present, no inflammatory lesions present, no masses present  · Bladder: non-tender to palpation  · Urethra: appears normal  · Cervix: normal   · Uterus: enlarged fibroid uterus ~10-12 wk sized  · Adnexa: no adnexal tenderness present  · Perineum: perineum within normal limits, no evidence of trauma, as above    Skin  · General Inspection: no rash, no lesions identified    Neurologic/Psychiatric  · Mental Status:  · Orientation: grossly oriented to person, place and time  · Mood and Affect: mood normal, affect appropriate      Assessment/Plan:  48 y.o. A2 perimenopausal female presenting for AE. +10-12 wk sized fibroid uterus, asymptomatic, exam unchanged. +lichen sclerosus. +menopausal symptoms.     Health maintenance:  -diet/exercise/healthy lifestyle/weight loss encouraged  -pap/HPV next   -STI screen declined  -refer for mammography  -refer for colonoscopy  -advised f/u with PCP for dexa  -topical vaginal estrace prn vaginal atrophy/dryness symptoms  -consider clobetasol if LS worsens, advised regular exams and low threshold to biopsy any suspicious areas in the future, understands increased risks of vulvar malignancy    RTC: for AE or sooner prn    Radha Robles MD  10/23/2020  3:05 PM

## 2020-10-23 NOTE — PATIENT INSTRUCTIONS
Pelvic Exam: Care Instructions  Your Care Instructions     When your doctor examines all of your pelvic organs, it's called a pelvic exam. Two good reasons to have this kind of exam are to check for sexually transmitted infections (STIs) and to get a Pap test. A Pap test is also called a Pap smear. It checks for early changes that can lead to cancer of the cervix. Sometimes a pelvic exam is part of a regular checkup. Your doctor may ask you to avoid vaginal sex, tampons, vaginal medicines, vaginal sprays or powders, and douching for 1 to 2 days before the test.  Other times, women have this kind of exam at any time of the month. This is because they have pelvic pain, bleeding, or discharge. Or they may have another pelvic problem. Before your exam, it's important to share some information with your doctor. For example, if you are a survivor of rape or sexual abuse, you can talk about any concerns you may have. Your doctor will also want to know if you are pregnant or use birth control. And he or she will want to hear about any problems, surgeries, or procedures you have had in your pelvic area. You will also need to tell your doctor when your last period was. Follow-up care is a key part of your treatment and safety. Be sure to make and go to all appointments, and call your doctor if you are having problems. It's also a good idea to know your test results and keep a list of the medicines you take. How is a pelvic exam done? · During a pelvic exam, you will:  ? Take off your clothes below the waist. You will get a paper or cloth cover to put over the lower half of your body. If this is regular checkup, you may undress completely and put on a gown. ? Lie on your back on an exam table. Your feet will be raised above you. Stirrups will support your feet. · The doctor will:  ? Ask you to relax your knees. Your knees need to lean out, toward the walls. ?  Check the opening of your vagina for sores or swelling. ? Gently put a tool called a speculum into your vagina. It opens the vagina a little bit. You will feel some pressure. But if you are relaxed, it will not hurt. It lets your doctor see inside the vagina. ? Use a small brush, spatula, or swab to get a sample of cells, if you are having a Pap test or culture. The doctor then removes the speculum. ? Put on gloves and put one or two fingers of one hand into your vagina. The other hand goes on your lower belly. This lets your doctor feel your pelvic organs. You will probably feel some pressure. Try to stay relaxed. ? Put one gloved finger into your rectum and one into your vagina, if needed. This can also help check your pelvic organs. This exam takes about 10 minutes. At the end, you will get a washcloth or tissue to clean your vaginal area. You can then get dressed. Why is a pelvic exam done? A pelvic exam may be done:  · As part of a woman's regular physical checkup. The exam may include a Pap test.  · To check for vaginal infection. · To check for sexually transmitted infections, such as chlamydia or herpes. · To help find the cause of abnormal uterine bleeding. · To look for problems like uterine fibroids, ovarian cysts, or uterine prolapse. · To find the cause of pelvic or belly pain. · Before inserting an intrauterine device (IUD) for birth control. · To collect evidence if you've been sexually assaulted. What are the risks of a pelvic exam?  There is a small chance that the doctor will find something on a pelvic exam that would not have caused a problem. This is called overdiagnosis. It could lead to tests or treatment you don't need. When should you call for help? Watch closely for changes in your health, and be sure to contact your doctor if you have any problems. Where can you learn more? Go to http://www.gray.com/  Enter M421 in the search box to learn more about \"Pelvic Exam: Care Instructions. \"  Current as of: November 8, 2019               Content Version: 12.6  © 1111-4837 [employer]. Care instructions adapted under license by [employer] (which disclaims liability or warranty for this information). If you have questions about a medical condition or this instruction, always ask your healthcare professional. [address] any warranty or liability for your use of this information.

## 2020-12-14 ENCOUNTER — TELEPHONE (OUTPATIENT)
Dept: OBGYN CLINIC | Age: 50
End: 2020-12-14

## 2020-12-14 NOTE — TELEPHONE ENCOUNTER
MD Kalen Altman Kingston Bottoms, RN    Caller: Unspecified (Today,  9:38 AM)               Yes please advise ultrasound in our office and help arrange appointment. Thanks~!       This nurse attempted to reach the patient and left a detailed message about MD recommendations and need to schedule ultrasound in our office and follow up

## 2020-12-14 NOTE — TELEPHONE ENCOUNTER
Call received at 535am    48year old patient last seen in the office on 10/23/2020    Patient calling to say that for the past two weeks she noticed an abdominal mass, that she reports is hard and moves some, below the umbilicus that she reports is the size of a large apple and is getting larger. Patient reports she feels pressure on her bladder but does not have to void much when she goes. Patient reports the discomfort is at a 3 on the pain scale of 1-10 and denies vaginal bleeding    Patient has seen her PCP and they have ruled out urinary issues and have ordered an abdominal ultrasound    Patient PCP is out of [facility] network and patient was advised that they can have order faxed to [facility] facility . Patient was provided with central scheduling phone number to get the fax number for the facility she wants to go to      Patient wondering if she needs a vaginal ultrasound as well.     Please advise

## 2020-12-18 ENCOUNTER — APPOINTMENT (OUTPATIENT)
Dept: ULTRASOUND IMAGING | Age: 50
End: 2020-12-18
Attending: EMERGENCY MEDICINE
Payer: COMMERCIAL

## 2020-12-18 ENCOUNTER — TELEPHONE (OUTPATIENT)
Dept: OBGYN CLINIC | Age: 50
End: 2020-12-18

## 2020-12-18 ENCOUNTER — APPOINTMENT (OUTPATIENT)
Dept: ULTRASOUND IMAGING | Age: 50
End: 2020-12-18
Attending: OBSTETRICS & GYNECOLOGY
Payer: COMMERCIAL

## 2020-12-18 ENCOUNTER — HOSPITAL ENCOUNTER (EMERGENCY)
Age: 50
Discharge: HOME | End: 2020-12-18
Attending: EMERGENCY MEDICINE | Admitting: EMERGENCY MEDICINE
Payer: COMMERCIAL

## 2020-12-18 VITALS
BODY MASS INDEX: 28.91 KG/M2 | RESPIRATION RATE: 16 BRPM | WEIGHT: 163.14 LBS | HEART RATE: 74 BPM | OXYGEN SATURATION: 100 % | TEMPERATURE: 97.8 F | SYSTOLIC BLOOD PRESSURE: 141 MMHG | DIASTOLIC BLOOD PRESSURE: 82 MMHG | HEIGHT: 63 IN

## 2020-12-18 DIAGNOSIS — Z12.31 SCREENING MAMMOGRAM, ENCOUNTER FOR: ICD-10-CM

## 2020-12-18 DIAGNOSIS — D25.1 INTRAMURAL LEIOMYOMA OF UTERUS: Primary | ICD-10-CM

## 2020-12-18 PROCEDURE — 76856 US EXAM PELVIC COMPLETE: CPT

## 2020-12-18 PROCEDURE — 99282 EMERGENCY DEPT VISIT SF MDM: CPT

## 2020-12-18 PROCEDURE — 76830 TRANSVAGINAL US NON-OB: CPT

## 2020-12-18 PROCEDURE — 74011250637 HC RX REV CODE- 250/637: Performed by: EMERGENCY MEDICINE

## 2020-12-18 RX ORDER — ACETAMINOPHEN 500 MG
1000 TABLET ORAL
Qty: 20 TAB | Refills: 0 | Status: SHIPPED | OUTPATIENT
Start: 2020-12-18 | End: 2022-05-20 | Stop reason: ALTCHOICE

## 2020-12-18 RX ORDER — ACETAMINOPHEN 500 MG
1000 TABLET ORAL
Status: COMPLETED | OUTPATIENT
Start: 2020-12-18 | End: 2020-12-18

## 2020-12-18 RX ORDER — IBUPROFEN 800 MG/1
800 TABLET ORAL
Qty: 20 TAB | Refills: 0 | Status: SHIPPED | OUTPATIENT
Start: 2020-12-18 | End: 2020-12-25

## 2020-12-18 RX ADMIN — ACETAMINOPHEN 1000 MG: 500 TABLET ORAL at 11:58

## 2020-12-18 NOTE — TELEPHONE ENCOUNTER
Patient advised of MD recommendations and to keep all appointments as currently scheduled patient verbalized understanding.

## 2020-12-18 NOTE — TELEPHONE ENCOUNTER
Follow-up    Tavares Oliveira MD  You 10 minutes ago (4:01 PM)     Yes to TVUS    Message text       Tavares Oliveira MD  You 11 minutes ago (4:01 PM)     That appointment date should be okay and I would like to repeat the ultrasound at that time to determine if there is any interval change. THanks!

## 2020-12-18 NOTE — ED PROVIDER NOTES
The history is provided by the patient. Abdominal Pain   This is a recurrent problem. Episode onset: 2 weeks ago. The problem occurs constantly. The problem has been gradually worsening. The pain is associated with an unknown factor. The pain is located in the suprapubic region. The quality of the pain is aching and cramping. The pain is mild. Pertinent negatives include no fever, no nausea, no vomiting and no constipation. Nothing worsens the pain. The pain is relieved by nothing. Past workup includes ultrasound.         Past Medical History:   Diagnosis Date    Depression     Gluten intolerance     Lichen sclerosus        Past Surgical History:   Procedure Laterality Date    HX  SECTION  ,          Family History:   Problem Relation Age of Onset    No Known Problems Mother     Diabetes Father     Hypertension Father     Breast Cancer Paternal Aunt         early 52's, 2 occurances    Colon Cancer Maternal Grandmother     Colon Cancer Maternal Grandfather        Social History     Socioeconomic History    Marital status:      Spouse name: Not on file    Number of children: Not on file    Years of education: Not on file    Highest education level: Not on file   Occupational History    Not on file   Social Needs    Financial resource strain: Not on file    Food insecurity     Worry: Not on file     Inability: Not on file    Transportation needs     Medical: Not on file     Non-medical: Not on file   Tobacco Use    Smoking status: Former Smoker    Smokeless tobacco: Never Used    Tobacco comment: quit 20 years ago   Substance and Sexual Activity    Alcohol use: Yes     Comment: 4 drinks per week    Drug use: No    Sexual activity: Yes     Partners: Male     Birth control/protection: None   Lifestyle    Physical activity     Days per week: Not on file     Minutes per session: Not on file    Stress: Not on file   Relationships    Social connections     Talks on phone: Not on file     Gets together: Not on file     Attends Holiness service: Not on file     Active member of club or organization: Not on file     Attends meetings of clubs or organizations: Not on file     Relationship status: Not on file    Intimate partner violence     Fear of current or ex partner: Not on file     Emotionally abused: Not on file     Physically abused: Not on file     Forced sexual activity: Not on file   Other Topics Concern    Not on file   Social History Narrative    Not on file         ALLERGIES: Codeine    Review of Systems   Constitutional: Negative for fever. Gastrointestinal: Positive for abdominal pain. Negative for constipation, nausea and vomiting. All other systems reviewed and are negative. Vitals:    12/18/20 1014   BP: (!) 141/82   Pulse: 74   Resp: 16   Temp: 97.8 °F (36.6 °C)   SpO2: 100%   Weight: 74 kg (163 lb 2.3 oz)   Height: 5' 3\" (1.6 m)            Physical Exam  Vitals signs and nursing note reviewed. Constitutional:       General: She is not in acute distress. Appearance: She is well-developed. HENT:      Head: Normocephalic and atraumatic. Eyes:      Conjunctiva/sclera: Conjunctivae normal.   Neck:      Musculoskeletal: Neck supple. Cardiovascular:      Rate and Rhythm: Normal rate and regular rhythm. Pulmonary:      Effort: Pulmonary effort is normal. No respiratory distress. Abdominal:      General: There is no distension. Comments: Palpable mass in the pelvis that is mildly tender and mobile   Musculoskeletal: Normal range of motion. General: No deformity. Skin:     General: Skin is warm and dry. Neurological:      Mental Status: She is alert. Cranial Nerves: No cranial nerve deficit. Psychiatric:         Behavior: Behavior normal.          MDM     48 y.o. female presents with known fibroid and growing mass in pelvis that has become increasingly uncomfortable.  She has had difficulty obtaining outpatient US for sizing of fibroid. Will perform US here to gauge change and refer to [facility] for outpatient evaluation of possible hysterectomy. Symptoms not consistent with ovarian torsion. Provided instructions for supportive care measures. Return precautions were discussed for worsening or new concerning symptoms.      Procedures

## 2020-12-18 NOTE — ED TRIAGE NOTES
Pt reports lower \"mass\" to her abdomen 2 weeks ago and was scheduled for an US but has not been able to complete that due to insurance issues. Pt reports the \"mass\" seems to have increased in size and she is now having \"pressure that comes in waves. \" Pt reports some nausea. No vomiting, diarrhea, urinary symptoms, fevers.

## 2020-12-18 NOTE — TELEPHONE ENCOUNTER
Call received at 330PM    48year old patient last seen in the office on 10/23/2020    Patient states she was just seen in the ER for the mass in her abdomen in creasing in size and painful with nausea.       Patient came here to [facility] and had abdominal and vaginal ultrasound    Patient currently has appointment on 1/8/2021 for vaginal ultrasound and Md visit    Patient is wondering if she needs to be seen sooner and does she need vaginal ultrasound      If sooner appointment advised when and what time

## 2021-01-08 ENCOUNTER — OFFICE VISIT (OUTPATIENT)
Dept: OBGYN CLINIC | Age: 51
End: 2021-01-08
Payer: COMMERCIAL

## 2021-01-08 VITALS
HEIGHT: 63 IN | WEIGHT: 161 LBS | BODY MASS INDEX: 28.53 KG/M2 | DIASTOLIC BLOOD PRESSURE: 68 MMHG | SYSTOLIC BLOOD PRESSURE: 112 MMHG

## 2021-01-08 DIAGNOSIS — R10.2 PELVIC PAIN IN FEMALE: ICD-10-CM

## 2021-01-08 DIAGNOSIS — N92.1 MENORRHAGIA WITH IRREGULAR CYCLE: ICD-10-CM

## 2021-01-08 DIAGNOSIS — D21.9 FIBROIDS: Primary | ICD-10-CM

## 2021-01-08 PROCEDURE — 99213 OFFICE O/P EST LOW 20 MIN: CPT | Performed by: OBSTETRICS & GYNECOLOGY

## 2021-01-08 PROCEDURE — 76830 TRANSVAGINAL US NON-OB: CPT | Performed by: OBSTETRICS & GYNECOLOGY

## 2021-01-08 NOTE — PROGRESS NOTES
Problem Visit    Caitlin Neal is a 50 y.o. perimenopausal female presenting for ultrasound and follow up of fibroids. Unbearable pain on 12/18/20 and went to the ED and had US. Posterior fibroid measuring slightly larger than it had previously (9cm, increased from 7cm). Started her menses while in ER (after 6 months of amenorrhea). Subsequently had 8 day menstrual cycle that was heavy, painful with passage of clots. Intermittent pain/discomfort since then. Ultrasound today showing fibroid actually slightly smaller than it was previously (measuring approx 6.2cm).     Previously with no menses in > 6 months, cycles have been irregular for the past couple of years. She was having a lot of hot flashes and night sweats but this abruptly stopped about a month ago. Uses topical estrogen cream vaginally prn. Some mood changes and difficulty losing weight.     Reports lichen sclerosus has been minimally symptomatic (clinical diagnosis, never biopsied, previously followed by Suki Maldonado MD). Pt reports clobetasol made symptoms worse in the past. She has noted some improvement with dietary changes she has made (cutting out gluten and almonds and trying to cut out dairy).     2 teenage children, son graduated HS and wants to join [employer], daughter in 11th grade. 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    TV ULTRASOUND 1/8/21:  THE UTERUS IS ANTEVERTED, NORMAL IN SIZE AND ECHOGENICITY. THERE APPEARS TO BE A FIBROID SEEN  POSTERIOR SUBSEROSAL MEASURING 57 X 62 MM.  THE ENDOMETRIUM MEASURES 5.9MM IN THICKNESS. NO MASSES OR ABNORMALITIES ARE SEEN.  RIGHT OVARY NOT SEEN DUE TO BOWEL GAS.  LEFT OVARY APPEARS WNL.  NO FREE FLUID IS SEEN IN THE CDS.    Pelvic ultrasound 12/18/20:  Realtime sonographic imaging of the pelvis was performed  transabdominally. The uterus measures 14.6 x 6.7 x 13.2 cm and has a 10.0 x 7.4  x 8.3 cm fibroid in the posterior uterine body. This previously measured 6.9 x  4.6  cm. The endometrial stripe measures 6 mm. The ovaries are not visualized due  to bowel gas. No free fluid. IMPRESSION: 10.0 cm fibroid posterior uterine body. The ovaries are not  visualized. Correlation with transvaginal ultrasound will be performed.     Transvaginal ultrasound 20:  Realtime sonographic imaging of the pelvis was  performed transvaginally. There is an 8.3 x 8.4 x 9.4 cm fibroid. The  endometrial stripe measures 5 mm. The ovaries are not visualized. No adnexal  mass. No free fluid. IMPRESSION: 9.4 cm fibroid posterior uterine body previously measuring 6.9 cm. The ovaries are not visualized. TV ULTRASOUND 19  THE UTERUS IS ANTEVERTED, ENLARGED IN SIZE AND HETEROGENOUS IN ECHOGENICITY. THERE APPEARS  TO BE A FIBROID SEEN POSTERIOR ML SUBMUCOSAL VS. INTRAMURAL MEASURING 68 X 46 MM AND LT LAT  SUBSEROSAL MEASURING 19 X 19 X 16 MM. THE ENDOMETRIUM MEASURES 13MM IN THICKNESS. NO MASSES OR ABNORMALITIES ARE SEEN. BILATERAL OVARIES NOT SEEN DUE TO BOWEL GAS. BILATERAL ADNEXAS APPEAR WNL. NO FREE FLUID IS SEEN IN THE CDS. Ob/Gyn Hx:   A1 - 2 c-sections,  &   LMP- 20, prior to that > 6 months ago  Menses- irregular for the last year  Contraception- withdrawal/condoms  STI- denies  ? SA- yes    Health maintenance:  Pap- 18 NILM HPV Neg, 10/2016 normal per patient, Dr Marta Mcadams, denies h/o abnl aps  Mammo- 2019 normal per patient  Colonoscopy- denies  Dexa- does report family h/o osteoporosis and chiropractor did xray that was concerning for low bone density (per pt) --> advised she discuss with PCP about possibility of early dexa scan    Past Medical History:   Diagnosis Date    Depression     Gluten intolerance     Lichen sclerosus        Past Surgical History:   Procedure Laterality Date    HX  SECTION  2003       Family History   Problem Relation Age of Onset    No Known Problems Mother     Diabetes Father     Hypertension Father    [employer] Breast Cancer Paternal Aunt         early 52's, 2 occurances    Colon Cancer Maternal Grandmother     Colon Cancer Maternal Grandfather        Social History     Socioeconomic History    Marital status:      Spouse name: Not on file    Number of children: Not on file    Years of education: Not on file    Highest education level: Not on file   Occupational History    Not on file   Social Needs    Financial resource strain: Not on file    Food insecurity     Worry: Not on file     Inability: Not on file    Transportation needs     Medical: Not on file     Non-medical: Not on file   Tobacco Use    Smoking status: Former Smoker    Smokeless tobacco: Never Used    Tobacco comment: quit 20 years ago   Substance and Sexual Activity    Alcohol use: Yes     Comment: 4 drinks per week    Drug use: No    Sexual activity: Yes     Partners: Male     Birth control/protection: None   Lifestyle    Physical activity     Days per week: Not on file     Minutes per session: Not on file    Stress: Not on file   Relationships    Social connections     Talks on phone: Not on file     Gets together: Not on file     Attends Rastafari service: Not on file     Active member of club or organization: Not on file     Attends meetings of clubs or organizations: Not on file     Relationship status: Not on file    Intimate partner violence     Fear of current or ex partner: Not on file     Emotionally abused: Not on file     Physically abused: Not on file     Forced sexual activity: Not on file   Other Topics Concern    Not on file   Social History Narrative    Not on file       Current Outpatient Medications   Medication Sig Dispense Refill    acetaminophen (TYLENOL) 500 mg tablet Take 2 Tabs by mouth every six (6) hours as needed for Pain or Fever.  20 Tab 0    citalopram (CELEXA) 20 mg tablet TAKE ONE TABLET BY MOUTH DAILY 30 Tab 0    estradiol (ESTRACE) 0.01 % (0.1 mg/gram) vaginal cream Apply pea-sized amount vaginally nightly x 2 weeks then twice weekly for maintenance 42.5 g 5       Allergies   Allergen Reactions    Codeine Nausea and Vomiting       Review of Systems - History obtained from the patient  Constitutional: negative for weight loss, fever, night sweats  HEENT: negative for hearing loss, earache, congestion, snoring, sorethroat  CV: negative for chest pain, palpitations, edema  Resp: negative for cough, shortness of breath, wheezing  GI: negative for change in bowel habits, abdominal pain, black or bloody stools  : negative for frequency, dysuria, hematuria, vaginal discharge, +irregular menses, pelvic pains, abdominal mass  MSK: negative for back pain, joint pain, muscle pain  Breast: negative for breast lumps, nipple discharge, galactorrhea  Skin: negative for itching, rash, hives  Neuro: negative for dizziness, headache, confusion, weakness  Psych: negative for anxiety, depression, change in mood  Heme/lymph: negative for bleeding, bruising, pallor    Physical Exam  Visit Vitals  /68   Ht 5' 3\" (1.6 m)   Wt 161 lb (73 kg)   LMP 12/18/2020   BMI 28.52 kg/m²     Constitutional  · Appearance: well-nourished, well developed, alert, in no acute distress    HENT  · Head and Face: appears normal    Neck  · Inspection/Palpation: normal appearance, no masses or tenderness  · Lymph Nodes: no lymphadenopathy present  · Thyroid: gland size normal, nontender, no nodules or masses present on palpation    Chest  · Respiratory Effort: non-labored breathing  · Auscultation: CTAB, normal breath sounds    Cardiovascular  · Heart:  · Auscultation: regular rate and rhythm without murmur  · Extremities: no peripheral edema    Gastrointestinal  · Abdominal Examination: abdomen non-tender to palpation, normal bowel sounds, no masses present  · Liver and spleen: no hepatomegaly present, spleen not palpable  · Hernias: no hernias identified    Skin  · General Inspection: no rash, no lesions identified    Neurologic/Psychiatric  · Mental Status:  · Orientation: grossly oriented to person, place and time  · Mood and Affect: mood normal, affect appropriate      Assessment/Plan:  48 y.o. A2 perimenopausal female with 6-7cm posterior fibroid, stable in size. Recently with painful heavy menstrual period after 6 months of amenorrhea.      -TVUS images and report reviewed, reassurance that fibroid size appears stable, no concern for rapid growth  -reviewed options for management of fibroids: expectant vs. Medical (gnrh ag/antag) vs. UFE vs. Surgical (laparoscopic hysterectomy), discussed risks/benefits of all options  -expectant management for now, with tylenol/ibuprofen prn, pt to consider other options  -pain/bleeding precautions, continue menstrual calendar, likely perimenopausal    RTC: 2-3 months for follow up, or sooner nicole Todd MD  2021  9:54 AM

## 2021-01-08 NOTE — PATIENT INSTRUCTIONS
Uterine Fibroids: Care Instructions  Your Care Instructions     Uterine fibroids are growths in the uterus. Fibroids aren't cancer. Doctors don't know what causes fibroids. Fibroids are very common in women during their childbearing years. Fibroids can grow on the inside of the uterus, in the muscle wall of the uterus, or near the outside wall of the uterus. In some women, fibroids cause painful cramps and heavy periods. In these cases, taking anti-inflammatory medicines, birth control pills, or using an intrauterine device (IUD) often helps decrease symptoms. Sometimes surgery is needed to treat fibroids. But if you are near menopause, you may want to wait and see if your symptoms get better. Most fibroids shrink and go away after menopause, when your menstrual periods stop completely. Follow-up care is a key part of your treatment and safety. Be sure to make and go to all appointments, and call your doctor if you are having problems. It's also a good idea to know your test results and keep a list of the medicines you take. How can you care for yourself at home? · If your doctor gave you medicine, take it as exactly as prescribed. Be safe with medicines. Call your doctor if you think you are having a problem with your medicine. · Take anti-inflammatory medicines for pain. These include ibuprofen (Advil, Motrin) and naproxen (Aleve). Read and follow all instructions on the label. · Use heat, such as a hot water bottle or a heating pad set on low, or a warm bath to relax tense muscles and relieve cramping. Put a thin cloth between the heating pad and your skin. Never go to sleep with a heating pad on. · Lie down and put a pillow under your knees. Or, lie on your side and bring your knees up to your chest. These positions may help relieve belly pain or pressure. · Keep track of how many sanitary pads or tampons you use each day. · Get at least 30 minutes of exercise on most days of the week.  Walking is a good choice. You also may want to do other activities, such as running, swimming, cycling, or playing tennis or team sports. · If you bleed longer than usual or have heavy bleeding, take a daily multivitamin with iron. When should you call for help? Call your doctor now or seek immediate medical care if:    · You have severe vaginal bleeding.     · You have new or worse belly or pelvic pain. Watch closely for changes in your health, and be sure to contact your doctor if:    · You have unusual vaginal bleeding.     · You do not get better as expected. Where can you learn more? Go to http://www.gray.com/  Enter B121 in the search box to learn more about \"Uterine Fibroids: Care Instructions. \"  Current as of: November 8, 2019               Content Version: 12.6  © 3164-7328 [employer]. Care instructions adapted under license by [employer] (which disclaims liability or warranty for this information). If you have questions about a medical condition or this instruction, always ask your healthcare professional. [address] any warranty or liability for your use of this information.

## 2021-01-22 RX ORDER — CITALOPRAM 20 MG/1
TABLET, FILM COATED ORAL
Qty: 30 TAB | Refills: 0 | Status: SHIPPED | OUTPATIENT
Start: 2021-01-22 | End: 2021-04-07 | Stop reason: SDUPTHER

## 2021-03-26 ENCOUNTER — HOSPITAL ENCOUNTER (OUTPATIENT)
Dept: MAMMOGRAPHY | Age: 51
Discharge: HOME | End: 2021-03-26
Attending: OBSTETRICS & GYNECOLOGY
Payer: COMMERCIAL

## 2021-03-26 DIAGNOSIS — Z12.31 SCREENING MAMMOGRAM, ENCOUNTER FOR: ICD-10-CM

## 2021-03-26 PROCEDURE — 77063 BREAST TOMOSYNTHESIS BI: CPT

## 2021-04-07 ENCOUNTER — TELEPHONE (OUTPATIENT)
Dept: OBGYN CLINIC | Age: 51
End: 2021-04-07

## 2021-04-07 NOTE — TELEPHONE ENCOUNTER
48year old patient last seen in the office on 10/23/2020 for annual exam      Requested prescription last sent on 1/22/2021     ok to refill     rx pended for amend/sign    Please advise

## 2021-04-08 RX ORDER — CITALOPRAM 20 MG/1
20 TABLET, FILM COATED ORAL DAILY
Qty: 90 TAB | Refills: 0 | Status: SHIPPED | OUTPATIENT
Start: 2021-04-08 | End: 2021-06-22

## 2021-04-08 NOTE — TELEPHONE ENCOUNTER
Will refill celexa for now, but she really needs to make an appointment with her PCP for continued management of her symptoms.

## 2021-06-22 ENCOUNTER — TELEPHONE (OUTPATIENT)
Dept: OBGYN CLINIC | Age: 51
End: 2021-06-22

## 2021-06-22 RX ORDER — CITALOPRAM 20 MG/1
20 TABLET, FILM COATED ORAL DAILY
Qty: 30 TABLET | Refills: 0 | Status: SHIPPED | OUTPATIENT
Start: 2021-06-22 | End: 2021-07-22

## 2021-06-22 RX ORDER — CITALOPRAM 20 MG/1
20 TABLET, FILM COATED ORAL DAILY
Qty: 90 TABLET | Refills: 0 | OUTPATIENT
Start: 2021-06-22 | End: 2021-09-20

## 2021-06-22 NOTE — TELEPHONE ENCOUNTER
Medication Refill    Michelle MD Argelia  You 14 minutes ago (1:21 PM)   SP  Rx for 30 day supply signed       my chart message sent to patient

## 2021-06-22 NOTE — TELEPHONE ENCOUNTER
48year old patient is asking for a 30 day refill of the prescription till she can see PCP    Please advise if ok    Rx pended for amend/sign    Thank you

## 2022-03-19 PROBLEM — L90.0 LICHEN SCLEROSUS: Status: ACTIVE | Noted: 2018-08-07

## 2022-03-19 PROBLEM — D25.9 UTERINE FIBROID: Status: ACTIVE | Noted: 2018-09-04

## 2022-05-02 ENCOUNTER — HOSPITAL ENCOUNTER (OUTPATIENT)
Dept: MAMMOGRAPHY | Age: 52
Discharge: HOME | End: 2022-05-02
Attending: OBSTETRICS & GYNECOLOGY
Payer: COMMERCIAL

## 2022-05-02 ENCOUNTER — TRANSCRIBE ORDER (OUTPATIENT)
Dept: MAMMOGRAPHY | Age: 52
End: 2022-05-02

## 2022-05-02 DIAGNOSIS — Z12.31 VISIT FOR SCREENING MAMMOGRAM: Primary | ICD-10-CM

## 2022-05-02 DIAGNOSIS — Z12.31 VISIT FOR SCREENING MAMMOGRAM: ICD-10-CM

## 2022-05-02 PROCEDURE — 77063 BREAST TOMOSYNTHESIS BI: CPT

## 2022-05-20 ENCOUNTER — OFFICE VISIT (OUTPATIENT)
Dept: FAMILY MEDICINE CLINIC | Age: 52
End: 2022-05-20
Payer: COMMERCIAL

## 2022-05-20 VITALS
RESPIRATION RATE: 16 BRPM | OXYGEN SATURATION: 98 % | BODY MASS INDEX: 23.55 KG/M2 | WEIGHT: 159 LBS | HEART RATE: 90 BPM | HEIGHT: 69 IN | DIASTOLIC BLOOD PRESSURE: 70 MMHG | SYSTOLIC BLOOD PRESSURE: 128 MMHG | TEMPERATURE: 98.1 F

## 2022-05-20 DIAGNOSIS — Z76.89 ESTABLISHING CARE WITH NEW DOCTOR, ENCOUNTER FOR: ICD-10-CM

## 2022-05-20 DIAGNOSIS — F41.9 ANXIETY AND DEPRESSION: Primary | ICD-10-CM

## 2022-05-20 DIAGNOSIS — F32.A ANXIETY AND DEPRESSION: Primary | ICD-10-CM

## 2022-05-20 PROCEDURE — 99203 OFFICE O/P NEW LOW 30 MIN: CPT | Performed by: NURSE PRACTITIONER

## 2022-05-20 RX ORDER — CITALOPRAM 10 MG/1
10 TABLET ORAL DAILY
Qty: 90 TABLET | Refills: 1 | Status: SHIPPED | OUTPATIENT
Start: 2022-05-20 | End: 2022-08-23 | Stop reason: SDUPTHER

## 2022-05-20 RX ORDER — CITALOPRAM 10 MG/1
10 TABLET ORAL DAILY
COMMUNITY
End: 2022-05-20 | Stop reason: SDUPTHER

## 2022-05-20 NOTE — PROGRESS NOTES
Identified pt with two pt identifiers(name and ). Chief Complaint   [facility]   [facility]        Health Maintenance Due   Topic    Hepatitis C Screening     Depression Screen     COVID-19 Vaccine (1)    DTaP/Tdap/Td series (1 - Tdap)    Lipid Screen     Colorectal Cancer Screening Combo     Shingrix Vaccine Age 50> (1 of 2)       Wt Readings from Last 3 Encounters:   22 159 lb (72.1 kg)   21 161 lb (73 kg)   20 163 lb 2.3 oz (74 kg)     Temp Readings from Last 3 Encounters:   22 98.1 °F (36.7 °C) (Temporal)   20 97.8 °F (36.6 °C)     BP Readings from Last 3 Encounters:   22 128/70   21 112/68   20 (!) 141/82     Pulse Readings from Last 3 Encounters:   22 90   20 74         Learning Assessment:  :     Learning Assessment 2019 10/21/2019   PRIMARY LEARNER Patient Patient   PRIMARY LANGUAGE ENGLISH ENGLISH   LEARNER PREFERENCE PRIMARY LISTENING LISTENING   ANSWERED BY patient patient   RELATIONSHIP SELF SELF       Depression Screening:  :     3 most recent PHQ Screens 2022   Little interest or pleasure in doing things Not at all   Feeling down, depressed, irritable, or hopeless Not at all   Total Score PHQ 2 0       Fall Risk Assessment:  :     No flowsheet data found. Abuse Screening:  :     Abuse Screening Questionnaire 2022   Do you ever feel afraid of your partner? N   Are you in a relationship with someone who physically or mentally threatens you? N   Is it safe for you to go home? Y       Coordination of Care Questionnaire:  :     1) Have you been to an emergency room, urgent care clinic since your last visit? no   Hospitalized since your last visit? no             2) Have you seen or consulted any other health care providers outside of [address] [place] [address] since your last visit?  yesPrior PCP [facility]  (Include any pap smears or colon screenings in this section.)    3) Do you have an Advance Directive on file? no  Are you interested in receiving information about Advance Directives? no    Patient is accompanied by N/A I have received verbal consent from Irma Odell to discuss any/all medical information while they are present in the room. 4.  For patients aged 39-70: Has the patient had a colonoscopy / FIT/ Cologuard? No      If the patient is female:    5. For patients aged 41-77: Has the patient had a mammogram within the past 2 years? Yes - no Care Gap present      6. For patients aged 21-65: Has the patient had a pap smear?  Yes - no Care Gap present

## 2022-05-20 NOTE — PROGRESS NOTES
HISTORY OF PRESENT ILLNESS  Fely Younger is a 46 y.o. female. HPI  Pt presents as a new patient to establish care    [facility], but has made a lot of diet changes and has had no issues since. She is in need of refills of her Celexa  She states that she has been on this medication for some time, and it works well for her  Denies concerns or complaints at this time  Review of Systems   Constitutional: Negative for fever. Physical Exam  Constitutional:       Appearance: Normal appearance. Cardiovascular:      Rate and Rhythm: Normal rate and regular rhythm. Heart sounds: Normal heart sounds. Pulmonary:      Effort: Pulmonary effort is normal.      Breath sounds: Normal breath sounds. Neurological:      Mental Status: She is alert. Psychiatric:         Mood and Affect: Mood normal.         Behavior: Behavior normal.         ASSESSMENT and PLAN    ICD-10-CM ICD-9-CM    1. Anxiety and depression  F41.9 300.00 citalopram (CELEXA) 10 mg tablet    F32. A 311    2. Establishing care with new doctor, encounter for  Z76.89 V65.8      Educated about returning for physical and fasting labs in the fall  Pt informed to return to office with worsening of symptoms, or PRN with any questions or concerns. Pt verbalizes understanding of plan of care and denies further questions or concerns at this time.

## 2022-08-23 DIAGNOSIS — F32.A ANXIETY AND DEPRESSION: ICD-10-CM

## 2022-08-23 DIAGNOSIS — F41.9 ANXIETY AND DEPRESSION: ICD-10-CM

## 2022-08-23 RX ORDER — CITALOPRAM 10 MG/1
10 TABLET ORAL DAILY
Qty: 90 TABLET | Refills: 1 | Status: SHIPPED | OUTPATIENT
Start: 2022-08-23

## 2022-09-12 ENCOUNTER — LAB ONLY (OUTPATIENT)
Dept: FAMILY MEDICINE CLINIC | Age: 52
End: 2022-09-12

## 2022-09-12 ENCOUNTER — OFFICE VISIT (OUTPATIENT)
Dept: FAMILY MEDICINE CLINIC | Age: 52
End: 2022-09-12
Payer: COMMERCIAL

## 2022-09-12 VITALS
DIASTOLIC BLOOD PRESSURE: 70 MMHG | HEIGHT: 64 IN | RESPIRATION RATE: 16 BRPM | TEMPERATURE: 97.8 F | SYSTOLIC BLOOD PRESSURE: 110 MMHG | BODY MASS INDEX: 26.8 KG/M2 | HEART RATE: 73 BPM | OXYGEN SATURATION: 98 % | WEIGHT: 157 LBS

## 2022-09-12 DIAGNOSIS — Z00.00 PHYSICAL EXAM: Primary | ICD-10-CM

## 2022-09-12 DIAGNOSIS — R35.0 URINARY FREQUENCY: ICD-10-CM

## 2022-09-12 DIAGNOSIS — Z13.29 SCREENING FOR THYROID DISORDER: ICD-10-CM

## 2022-09-12 DIAGNOSIS — Z12.11 SCREEN FOR COLON CANCER: ICD-10-CM

## 2022-09-12 DIAGNOSIS — Z11.59 ENCOUNTER FOR HEPATITIS C SCREENING TEST FOR LOW RISK PATIENT: ICD-10-CM

## 2022-09-12 DIAGNOSIS — Z13.220 SCREENING FOR CHOLESTEROL LEVEL: ICD-10-CM

## 2022-09-12 DIAGNOSIS — Z00.00 PHYSICAL EXAM: ICD-10-CM

## 2022-09-12 PROCEDURE — 99396 PREV VISIT EST AGE 40-64: CPT | Performed by: NURSE PRACTITIONER

## 2022-09-12 NOTE — PROGRESS NOTES
Identified pt with two pt identifiers(name and ). Chief Complaint   Patient presents with    Physical    Labs     fasting        Health Maintenance Due   Topic    Hepatitis C Screening     COVID-19 Vaccine (1)    DTaP/Tdap/Td series (1 - Tdap)    Lipid Screen     Colorectal Cancer Screening Combo     Shingrix Vaccine Age 50> (1 of 2)    Flu Vaccine (1)       Wt Readings from Last 3 Encounters:   22 157 lb (71.2 kg)   22 159 lb (72.1 kg)   21 161 lb (73 kg)     Temp Readings from Last 3 Encounters:   22 97.8 °F (36.6 °C) (Temporal)   22 98.1 °F (36.7 °C) (Temporal)   20 97.8 °F (36.6 °C)     BP Readings from Last 3 Encounters:   22 110/70   22 128/70   21 112/68     Pulse Readings from Last 3 Encounters:   22 73   22 90   20 74         Learning Assessment:  :     Learning Assessment 2019 10/21/2019   PRIMARY LEARNER Patient Patient   PRIMARY LANGUAGE ENGLISH ENGLISH   LEARNER PREFERENCE PRIMARY LISTENING LISTENING   ANSWERED BY patient patient   RELATIONSHIP SELF SELF       Depression Screening:  :     3 most recent PHQ Screens 2022   Little interest or pleasure in doing things Not at all   Feeling down, depressed, irritable, or hopeless Not at all   Total Score PHQ 2 0       Fall Risk Assessment:  :     No flowsheet data found. Abuse Screening:  :     Abuse Screening Questionnaire 2022   Do you ever feel afraid of your partner? N   Are you in a relationship with someone who physically or mentally threatens you? N   Is it safe for you to go home?  Y       Coordination of Care Questionnaire:  :     1) Have you been to an emergency room, urgent care clinic since your last visit? no   Hospitalized since your last visit? no             2) Have you seen or consulted any other health care providers outside of [address] [place] [address] since your last visit? no  (Include any pap smears or colon screenings in this section.)    3) Do you have an Advance Directive on file? no  Are you interested in receiving information about Advance Directives? no    Patient is accompanied by N/A I have received verbal consent from Barbara Florence to discuss any/all medical information while they are present in the room. 4.  For patients aged 39-70: Has the patient had a colonoscopy / FIT/ Cologuard? No      If the patient is female:    5. For patients aged 41-77: Has the patient had a mammogram within the past 2 years? Yes - no Care Gap present      6. For patients aged 21-65: Has the patient had a pap smear?  Yes - no Care Gap present

## 2022-09-12 NOTE — PROGRESS NOTES
Subjective:   46 y.o. female for Well Woman Check. Her gyne and breast care is done elsewhere by her Ob-Gyne physician. Patient Active Problem List    Diagnosis Date Noted    Uterine fibroid 8644    Lichen sclerosus      Current Outpatient Medications   Medication Sig Dispense Refill    citalopram (CELEXA) 10 mg tablet Take 1 Tablet by mouth daily. 90 Tablet 1    estradiol (ESTRACE) 0.01 % (0.1 mg/gram) vaginal cream Apply pea-sized amount vaginally nightly x 2 weeks then twice weekly for maintenance 42.5 g 5     Allergies   Allergen Reactions    Codeine Nausea and Vomiting     Past Medical History:   Diagnosis Date    Depression     Fibroid, uterine     Gluten intolerance     Lichen sclerosus      Past Surgical History:   Procedure Laterality Date    HX  SECTION  ,      Family History   Problem Relation Age of Onset    No Known Problems Mother     Diabetes Father     Hypertension Father     Breast Cancer Paternal Aunt         early 52's, 2 occurances    Colon Cancer Maternal Grandmother     Colon Cancer Maternal Grandfather      Social History     Tobacco Use    Smoking status: Former    Smokeless tobacco: Never    Tobacco comments:     quit 20 years ago   Substance Use Topics    Alcohol use: Yes     Comment: 4 drinks per week        No results found for: WBC, WBCT, WBCPOC, HGB, HGBPOC, HCT, HCTPOC, PLT, PLTPOC, MCV, MCVPOC, HGBEXT, HCTEXT, PLTEXT  No results found for: CHOL, CHOLPOCT, HDL, LDL, LDLC, LDLCPOC, LDLCEXT, TRIGL, TGLPOCT, CHHD, CHHDX  No results found for: GFRNA, GFRNAPOC, GFRAA, GFRAAPOC, CREA, CREAPOC, BUN, IBUN, BUNPOC, NA, NAPOC, K, KPOCT, CL, CLPOC, CO2, CO2POC, MG, PHOS, ALBEU, PTH, PTHILT, EPO     Specific concerns today: Pt is here for physical and fasting labs. Her only concern is that she has the urge to urinate frequently, but then only passes very little urine. This has been going on for about a week or more.   No pain with urination  No blood in urine  No nausea, no vomiting, no diarrhea  She notes that she coughed yesterday and then had some leakage of urine, which is abnormal for her. Review of Systems  Pertinent items are noted in HPI. Objective:   Blood pressure 110/70, pulse 73, temperature 97.8 °F (36.6 °C), temperature source Temporal, resp. rate 16, height 5' 4\" (1.626 m), weight 157 lb (71.2 kg), SpO2 98 %. Physical Examination:   Mental status - alert, oriented to person, place, and time  Eyes - pupils equal and reactive, extraocular eye movements intact  Ears - bilateral TM's and external ear canals normal  Nose - normal and patent, no erythema, discharge or polyps  Mouth - mucous membranes moist, pharynx normal without lesions  Neck - supple, no significant adenopathy  Lymphatics - no palpable lymphadenopathy, no hepatosplenomegaly  Chest - clear to auscultation, no wheezes, rales or rhonchi, symmetric air entry  Heart - normal rate, regular rhythm, normal S1, S2, no murmurs, rubs, clicks or gallops  Abdomen - soft, nontender, nondistended, no masses or organomegaly  Neurological - alert, oriented, normal speech, no focal findings or movement disorder noted  Musculoskeletal - no joint tenderness, deformity or swelling  Extremities - peripheral pulses normal, no pedal edema, no clubbing or cyanosis  Skin - normal coloration and turgor, no rashes, no suspicious skin lesions noted     Assessment/Plan:     continue present plan    ICD-10-CM ICD-9-CM    1. Physical exam  Z00.00 V70.9 CBC W/O DIFF      METABOLIC PANEL, COMPREHENSIVE      LIPID PANEL      THYROID CASCADE PROFILE      HEPATITIS C AB      2. Screening for cholesterol level  Z13.220 V77.91 LIPID PANEL      3. Screening for thyroid disorder  Z13.29 V77.0 THYROID CASCADE PROFILE      4. Encounter for hepatitis C screening test for low risk patient  Z11.59 V73.89 HEPATITIS C AB      5.  Screen for colon cancer  Z12.11 V76.51 REFERRAL TO GASTROENTEROLOGY      6. Urinary frequency R35.0 788.41 CULTURE, URINE        Will notify when labs return, and inform her of any change in plan of care at that time  Should urine culture return negative, will send to urology    Pt informed to return to office with worsening of symptoms, or PRN with any questions or concerns. Pt verbalizes understanding of plan of care and denies further questions or concerns at this time.

## 2022-09-13 ENCOUNTER — TELEPHONE (OUTPATIENT)
Dept: FAMILY MEDICINE CLINIC | Age: 52
End: 2022-09-13

## 2022-09-13 LAB
ALBUMIN SERPL-MCNC: 4 G/DL (ref 3.5–5)
ALBUMIN/GLOB SERPL: 1.4 {RATIO} (ref 1.1–2.2)
ALP SERPL-CCNC: 87 U/L (ref 45–117)
ALT SERPL-CCNC: 33 U/L (ref 12–78)
ANION GAP SERPL CALC-SCNC: 7 MMOL/L (ref 5–15)
AST SERPL-CCNC: 19 U/L (ref 15–37)
BILIRUB SERPL-MCNC: 0.6 MG/DL (ref 0.2–1)
BUN SERPL-MCNC: 19 MG/DL (ref 6–20)
BUN/CREAT SERPL: 20 (ref 12–20)
CALCIUM SERPL-MCNC: 9.3 MG/DL (ref 8.5–10.1)
CHLORIDE SERPL-SCNC: 105 MMOL/L (ref 97–108)
CHOLEST SERPL-MCNC: 224 MG/DL
CO2 SERPL-SCNC: 28 MMOL/L (ref 21–32)
CREAT SERPL-MCNC: 0.95 MG/DL (ref 0.55–1.02)
ERYTHROCYTE [DISTWIDTH] IN BLOOD BY AUTOMATED COUNT: 13.5 % (ref 11.5–14.5)
GLOBULIN SER CALC-MCNC: 2.9 G/DL (ref 2–4)
GLUCOSE SERPL-MCNC: 87 MG/DL (ref 65–100)
HCT VFR BLD AUTO: 41.1 % (ref 35–47)
HCV AB SERPL QL IA: NONREACTIVE
HDLC SERPL-MCNC: 135 MG/DL
HDLC SERPL: 1.7 {RATIO} (ref 0–5)
HGB BLD-MCNC: 13.2 G/DL (ref 11.5–16)
LDLC SERPL CALC-MCNC: 77.8 MG/DL (ref 0–100)
MCH RBC QN AUTO: 30.8 PG (ref 26–34)
MCHC RBC AUTO-ENTMCNC: 32.1 G/DL (ref 30–36.5)
MCV RBC AUTO: 95.8 FL (ref 80–99)
NRBC # BLD: 0 K/UL (ref 0–0.01)
NRBC BLD-RTO: 0 PER 100 WBC
PLATELET # BLD AUTO: 213 K/UL (ref 150–400)
PMV BLD AUTO: 10.5 FL (ref 8.9–12.9)
POTASSIUM SERPL-SCNC: 4.6 MMOL/L (ref 3.5–5.1)
PROT SERPL-MCNC: 6.9 G/DL (ref 6.4–8.2)
RBC # BLD AUTO: 4.29 M/UL (ref 3.8–5.2)
SODIUM SERPL-SCNC: 140 MMOL/L (ref 136–145)
TRIGL SERPL-MCNC: 56 MG/DL (ref ?–150)
VLDLC SERPL CALC-MCNC: 11.2 MG/DL
WBC # BLD AUTO: 4.4 K/UL (ref 3.6–11)

## 2022-09-13 NOTE — PROGRESS NOTES
Please call patient and let her know that her labs returned stable. Cholesterol is elevated, but HDL is elevated which is cardioprotective.   Should return annually for physical.  Will notify when urine culture returns  Thanks

## 2022-09-13 NOTE — TELEPHONE ENCOUNTER
----- Message from Rhonda Carrillo NP sent at 9/13/2022  1:01 PM EDT -----  Please call patient and let her know that her labs returned stable. Cholesterol is elevated, but HDL is elevated which is cardioprotective.   Should return annually for physical.  Will notify when urine culture returns  Thanks

## 2022-09-13 NOTE — TELEPHONE ENCOUNTER
Patient returned Ambers call, I relayed madelines lab result messages. Patient understood.       9/13/22

## 2022-09-14 ENCOUNTER — TELEPHONE (OUTPATIENT)
Dept: FAMILY MEDICINE CLINIC | Age: 52
End: 2022-09-14

## 2022-09-14 DIAGNOSIS — R35.0 URINARY FREQUENCY: Primary | ICD-10-CM

## 2022-09-14 DIAGNOSIS — R79.89 ELEVATED TSH: ICD-10-CM

## 2022-09-14 LAB
BACTERIA SPEC CULT: NORMAL
INTERPRETIVE COMMENT, 330017: NORMAL
SERVICE CMNT-IMP: NORMAL
T4 FREE SERPL-MCNC: 1.18 NG/DL (ref 0.82–1.77)
THYROID PEROXIDASE (TPO) AB, 006677: 10 IU/ML (ref 0–34)
TSH SERPL-ACNC: 5.65 UIU/ML (ref 0.45–4.5)

## 2022-09-14 NOTE — TELEPHONE ENCOUNTER
----- Message from Alexis Amezcua NP sent at 9/14/2022  7:44 AM EDT -----  Please call patient and let her know that her TSH returned elevated. Should return in 4-6 weeks for lab only re-check. Urine culture returned negative, no UTI. I have placed order for urology and she should call for appt  Thanks!

## 2022-09-14 NOTE — PROGRESS NOTES
Please call patient and let her know that her TSH returned elevated. Should return in 4-6 weeks for lab only re-check. Urine culture returned negative, no UTI. I have placed order for urology and she should call for appt  Thanks!

## 2023-02-24 ENCOUNTER — PATIENT MESSAGE (OUTPATIENT)
Dept: FAMILY MEDICINE CLINIC | Age: 53
End: 2023-02-24

## 2023-02-24 NOTE — TELEPHONE ENCOUNTER
----- Message from Aubrey Froy sent at 2/24/2023 12:35 PM EST -----  Regarding: Colon cancer screening  Hello, I've misplaced my referral for scheduling a screening. Will you please send me the information needed to make my appointment?   Thanks,  ALLISON Gil

## 2023-05-19 RX ORDER — ESTRADIOL 0.1 MG/G
CREAM VAGINAL
COMMUNITY
Start: 2019-11-12

## 2023-05-19 RX ORDER — CITALOPRAM 10 MG/1
10 TABLET ORAL DAILY
COMMUNITY
Start: 2022-08-23 | End: 2023-05-30

## 2023-05-30 RX ORDER — CITALOPRAM 10 MG/1
TABLET ORAL
Qty: 90 TABLET | Refills: 0 | Status: SHIPPED | OUTPATIENT
Start: 2023-05-30

## 2023-05-30 SDOH — ECONOMIC STABILITY: TRANSPORTATION INSECURITY
IN THE PAST 12 MONTHS, HAS LACK OF TRANSPORTATION KEPT YOU FROM MEETINGS, WORK, OR FROM GETTING THINGS NEEDED FOR DAILY LIVING?: NO

## 2023-05-30 SDOH — ECONOMIC STABILITY: FOOD INSECURITY: WITHIN THE PAST 12 MONTHS, YOU WORRIED THAT YOUR FOOD WOULD RUN OUT BEFORE YOU GOT MONEY TO BUY MORE.: NEVER TRUE

## 2023-05-30 SDOH — ECONOMIC STABILITY: FOOD INSECURITY: WITHIN THE PAST 12 MONTHS, THE FOOD YOU BOUGHT JUST DIDN'T LAST AND YOU DIDN'T HAVE MONEY TO GET MORE.: NEVER TRUE

## 2023-05-30 SDOH — ECONOMIC STABILITY: HOUSING INSECURITY
IN THE LAST 12 MONTHS, WAS THERE A TIME WHEN YOU DID NOT HAVE A STEADY PLACE TO SLEEP OR SLEPT IN A SHELTER (INCLUDING NOW)?: NO

## 2023-05-30 SDOH — ECONOMIC STABILITY: INCOME INSECURITY: HOW HARD IS IT FOR YOU TO PAY FOR THE VERY BASICS LIKE FOOD, HOUSING, MEDICAL CARE, AND HEATING?: NOT VERY HARD

## 2023-05-31 ENCOUNTER — OFFICE VISIT (OUTPATIENT)
Age: 53
End: 2023-05-31
Payer: COMMERCIAL

## 2023-05-31 VITALS
WEIGHT: 158.4 LBS | RESPIRATION RATE: 16 BRPM | BODY MASS INDEX: 27.04 KG/M2 | TEMPERATURE: 97 F | DIASTOLIC BLOOD PRESSURE: 68 MMHG | HEART RATE: 83 BPM | SYSTOLIC BLOOD PRESSURE: 110 MMHG | OXYGEN SATURATION: 97 % | HEIGHT: 64 IN

## 2023-05-31 DIAGNOSIS — M54.2 NECK PAIN: ICD-10-CM

## 2023-05-31 DIAGNOSIS — M25.531 RIGHT WRIST PAIN: Primary | ICD-10-CM

## 2023-05-31 PROCEDURE — 99213 OFFICE O/P EST LOW 20 MIN: CPT | Performed by: NURSE PRACTITIONER

## 2023-05-31 RX ORDER — METHYLPREDNISOLONE 4 MG/1
TABLET ORAL
Qty: 1 KIT | Refills: 0 | Status: SHIPPED | OUTPATIENT
Start: 2023-05-31 | End: 2023-06-06

## 2023-05-31 RX ORDER — CYCLOBENZAPRINE HCL 10 MG
10 TABLET ORAL NIGHTLY PRN
Qty: 10 TABLET | Refills: 0 | Status: SHIPPED | OUTPATIENT
Start: 2023-05-31 | End: 2023-06-10

## 2023-05-31 RX ORDER — DICLOFENAC SODIUM 75 MG/1
75 TABLET, DELAYED RELEASE ORAL 2 TIMES DAILY
Qty: 60 TABLET | Refills: 0 | Status: SHIPPED | OUTPATIENT
Start: 2023-05-31

## 2023-05-31 SDOH — ECONOMIC STABILITY: INCOME INSECURITY: HOW HARD IS IT FOR YOU TO PAY FOR THE VERY BASICS LIKE FOOD, HOUSING, MEDICAL CARE, AND HEATING?: NOT HARD AT ALL

## 2023-05-31 SDOH — ECONOMIC STABILITY: FOOD INSECURITY: WITHIN THE PAST 12 MONTHS, THE FOOD YOU BOUGHT JUST DIDN'T LAST AND YOU DIDN'T HAVE MONEY TO GET MORE.: NEVER TRUE

## 2023-05-31 SDOH — ECONOMIC STABILITY: FOOD INSECURITY: WITHIN THE PAST 12 MONTHS, YOU WORRIED THAT YOUR FOOD WOULD RUN OUT BEFORE YOU GOT MONEY TO BUY MORE.: NEVER TRUE

## 2023-05-31 ASSESSMENT — PATIENT HEALTH QUESTIONNAIRE - PHQ9
4. FEELING TIRED OR HAVING LITTLE ENERGY: 0
1. LITTLE INTEREST OR PLEASURE IN DOING THINGS: 0
SUM OF ALL RESPONSES TO PHQ QUESTIONS 1-9: 0
SUM OF ALL RESPONSES TO PHQ9 QUESTIONS 1 & 2: 0
2. FEELING DOWN, DEPRESSED OR HOPELESS: 0
SUM OF ALL RESPONSES TO PHQ QUESTIONS 1-9: 0
3. TROUBLE FALLING OR STAYING ASLEEP: 0
6. FEELING BAD ABOUT YOURSELF - OR THAT YOU ARE A FAILURE OR HAVE LET YOURSELF OR YOUR FAMILY DOWN: 0
10. IF YOU CHECKED OFF ANY PROBLEMS, HOW DIFFICULT HAVE THESE PROBLEMS MADE IT FOR YOU TO DO YOUR WORK, TAKE CARE OF THINGS AT HOME, OR GET ALONG WITH OTHER PEOPLE: 0
2. FEELING DOWN, DEPRESSED OR HOPELESS: 0
SUM OF ALL RESPONSES TO PHQ QUESTIONS 1-9: 0
9. THOUGHTS THAT YOU WOULD BE BETTER OFF DEAD, OR OF HURTING YOURSELF: 0
5. POOR APPETITE OR OVEREATING: 0
SUM OF ALL RESPONSES TO PHQ QUESTIONS 1-9: 0
1. LITTLE INTEREST OR PLEASURE IN DOING THINGS: 0
SUM OF ALL RESPONSES TO PHQ QUESTIONS 1-9: 0
7. TROUBLE CONCENTRATING ON THINGS, SUCH AS READING THE NEWSPAPER OR WATCHING TELEVISION: 0
SUM OF ALL RESPONSES TO PHQ9 QUESTIONS 1 & 2: 0
SUM OF ALL RESPONSES TO PHQ QUESTIONS 1-9: 0
8. MOVING OR SPEAKING SO SLOWLY THAT OTHER PEOPLE COULD HAVE NOTICED. OR THE OPPOSITE, BEING SO FIGETY OR RESTLESS THAT YOU HAVE BEEN MOVING AROUND A LOT MORE THAN USUAL: 0

## 2023-05-31 NOTE — PROGRESS NOTES
Subjective:      Patient ID: Margaret Serra is a 46 y.o. female. 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 Musculoskeletal:  Positive for neck pain. Objective:   Physical Exam  Constitutional:       Appearance: Normal appearance. HENT:      Head: Normocephalic and atraumatic. Cardiovascular:      Rate and Rhythm: Normal rate and regular rhythm. Pulmonary:      Breath sounds: Normal breath sounds. Musculoskeletal:      Right wrist: Bony tenderness present. No swelling or effusion. Decreased range of motion. Cervical back: Normal range of motion and neck supple. Neurological:      Mental Status: She is alert. Psychiatric:         Mood and Affect: Mood normal.         Behavior: Behavior normal.       Assessment / Plan:          Diagnosis Orders   1. Right wrist pain  methylPREDNISolone (MEDROL DOSEPACK) 4 MG tablet    diclofenac (VOLTAREN) 75 MG EC tablet    LEN Petit MD, Orthopedic Surgery (elbow, hand, wrist), [place]      2.  Neck pain  cyclobenzaprine (FLEXERIL) 10 MG tablet    methylPREDNISolone (MEDROL DOSEPACK) 4 MG tablet    diclofenac (VOLTAREN) 75 MG EC tablet    XR CERVICAL SPINE (2-3 VIEWS)        Educated about taking medications as prescribed  Should pain persist, should get neck x-ray and
visit? Hospitalized since your last visit? \" no    2. \"Have you seen or consulted any other health care providers outside of the [address] [place] [address] since your last visit? \" no     3. This patient is accompanied in the office by her self. 4. For patients aged 39-70: Has the patient had a colonoscopy / FIT/ Cologuard? No pt has one scheduled for July       If the patient is female:    5. For patients aged 41-77: Has the patient had a mammogram within the past 2 years? Yes - no Care Gap present      6. For patients aged 21-65: Has the patient had a pap smear?  Yes - no Care Gap present EVA Lin

## 2023-07-27 RX ORDER — CITALOPRAM HYDROBROMIDE 10 MG/1
10 TABLET ORAL DAILY
Qty: 90 TABLET | Refills: 0 | Status: SHIPPED | OUTPATIENT
Start: 2023-07-27

## 2023-10-13 NOTE — PERIOP NOTE
Hello,     You are scheduled to have surgery tomorrow at [address] [place] [address]. We would like for you to arrive at  11:15 am  We are located on the second floor, [address]. You will check-in at the registration desk located outside the elevators on the second floor prior to proceeding to [address]. Wear loose, comfortable clothing and leave all your jewelry at home. You may bring your cell phone with you. One family member will be allowed in the pre-op area once you are dressed. We look forward to seeing you! Call [phone] for questions after hours and [phone] between 5:30AM and 6PM.     Thanks!     [id number] [address] A[facility] PREOP TEAM

## 2023-10-16 ENCOUNTER — HOSPITAL ENCOUNTER (OUTPATIENT)
Facility: HOSPITAL | Age: 53
Setting detail: OUTPATIENT SURGERY
Discharge: HOME | End: 2023-10-16
Attending: ORTHOPAEDIC SURGERY | Admitting: ORTHOPAEDIC SURGERY
Payer: COMMERCIAL

## 2023-10-16 VITALS
WEIGHT: 155 LBS | BODY MASS INDEX: 26.46 KG/M2 | HEIGHT: 64 IN | TEMPERATURE: 97.8 F | DIASTOLIC BLOOD PRESSURE: 79 MMHG | RESPIRATION RATE: 10 BRPM | OXYGEN SATURATION: 100 % | SYSTOLIC BLOOD PRESSURE: 123 MMHG | HEART RATE: 69 BPM

## 2023-10-16 DIAGNOSIS — M65.4 DE QUERVAIN'S TENOSYNOVITIS, RIGHT: Primary | ICD-10-CM

## 2023-10-16 PROCEDURE — 7100000011 HC PHASE II RECOVERY - ADDTL 15 MIN: Performed by: ORTHOPAEDIC SURGERY

## 2023-10-16 PROCEDURE — 7100000010 HC PHASE II RECOVERY - FIRST 15 MIN: Performed by: ORTHOPAEDIC SURGERY

## 2023-10-16 PROCEDURE — 2500000003 HC RX 250 WO HCPCS: Performed by: ORTHOPAEDIC SURGERY

## 2023-10-16 PROCEDURE — 3600000012 HC SURGERY LEVEL 2 ADDTL 15MIN: Performed by: ORTHOPAEDIC SURGERY

## 2023-10-16 PROCEDURE — 6360000002 HC RX W HCPCS: Performed by: ORTHOPAEDIC SURGERY

## 2023-10-16 PROCEDURE — 2709999900 HC NON-CHARGEABLE SUPPLY: Performed by: ORTHOPAEDIC SURGERY

## 2023-10-16 PROCEDURE — 3600000002 HC SURGERY LEVEL 2 BASE: Performed by: ORTHOPAEDIC SURGERY

## 2023-10-16 RX ORDER — ONDANSETRON 4 MG/1
4 TABLET, FILM COATED ORAL 3 TIMES DAILY PRN
Qty: 30 TABLET | Refills: 0 | Status: SHIPPED | OUTPATIENT
Start: 2023-10-16

## 2023-10-16 RX ORDER — HYDROCODONE BITARTRATE AND ACETAMINOPHEN 5; 325 MG/1; MG/1
1 TABLET ORAL EVERY 6 HOURS PRN
Qty: 20 TABLET | Refills: 0 | Status: SHIPPED | OUTPATIENT
Start: 2023-10-16 | End: 2023-10-21

## 2023-10-16 RX ORDER — DOCUSATE SODIUM 100 MG/1
100 CAPSULE, LIQUID FILLED ORAL DAILY PRN
Qty: 30 CAPSULE | Refills: 0 | Status: SHIPPED | OUTPATIENT
Start: 2023-10-16

## 2023-10-16 ASSESSMENT — PAIN - FUNCTIONAL ASSESSMENT: PAIN_FUNCTIONAL_ASSESSMENT: 0-10

## 2023-10-16 NOTE — BRIEF OP NOTE
Brief Postoperative Note      Patient: Panfilo Wayne  YOB: 1970  MRN: [id number]    Date of Procedure: 10/16/2023    Pre-Op Diagnosis Codes:     * De Quervain's tenosynovitis [M65.4]    Post-Op Diagnosis: Same       Procedure(s):  RIGHT DEQUERVAINS RELEASE    Surgeon(s):  Sona Kilpatrick MD    Assistant:  Physician Assistant: Ct Eller PA-C    Anesthesia: Local    Estimated Blood Loss (mL): Minimal    Complications: None    Specimens:   * No specimens in log *    Implants:  * No implants in log *      Drains: * No LDAs found *    Findings: As above.        Electronically signed by Ct Eller PA-C on 10/16/2023 at 2:52 PM

## 2023-10-16 NOTE — H&P
Orthopedic Admission History and Physical        NAME: Antelmo Ng       :  1970       MRN:  [id number]      Subjective:     Patient is a 48 y.o. female who presents with history of right wrist de Quervain's tenosynovitis. Presents today for surgical treatment. Patient Active Problem List    Diagnosis Date Noted    Uterine fibroid     Lichen sclerosus 10/84/7276     Past Medical History:   Diagnosis Date    Depression     Fibroid, uterine     Gluten intolerance     Lichen sclerosus       Past Surgical History:   Procedure Laterality Date    ANAL FISTULOTOMY       SECTION  ,       Prior to Admission medications    Medication Sig Start Date End Date Taking? Authorizing Provider   citalopram (CELEXA) 10 MG tablet Take 1 tablet by mouth daily 23   Vikas Castaneda MD   diclofenac (VOLTAREN) 75 MG EC tablet Take 1 tablet by mouth 2 times daily  Patient not taking: Reported on 10/16/2023 5/31/23   ARCHANA Cardoza - NP   estradiol (ESTRACE) 0.1 MG/GM vaginal cream Apply pea-sized amount vaginally nightly x 2 weeks then twice weekly for maintenance  Patient not taking: Reported on 10/16/2023 11/12/19   Automatic Reconciliation, Ar     Current Facility-Administered Medications   Medication Dose Route Frequency    ceFAZolin (ANCEF) 2,000 mg in sterile water 20 mL IV syringe  2,000 mg IntraVENous Once      Allergies   Allergen Reactions    Codeine Nausea And Vomiting      Social History     Tobacco Use    Smoking status: Former     Packs/day: 0.50     Years: 22.00     Additional pack years: 0.00     Total pack years: 11.00     Types: Cigarettes     Quit date: 2000     Years since quittin.8    Smokeless tobacco: Never   Substance Use Topics    Alcohol use:  Yes     Alcohol/week: 6.0 standard drinks of alcohol     Types: 6 Glasses of wine per week      Family History   Problem Relation Age of Onset    Colon Cancer Maternal Grandmother     Colon Cancer Maternal

## 2023-10-16 NOTE — DISCHARGE INSTRUCTIONS
Upper Extremity Surgery Discharge Instructions  Dr. Kathrine Thomas / Analisa Bruno PA-C    Please take the time to review the following instructions before you leave the hospital and use them as guidelines during your recovery from surgery. If you have any questions, you may contact my office at [phone] or via [employer] messaging, which is typically the quickest and most direct method. Wound Care / Dressing Change    Do NOT remove your dressing or get them wet. John Daft / Bathing    May bathe/shower as long as dressing/splint/cast is kept dry. Sling    You are not required to wear a sling and should do so only as needed for comfort. You may remove your sling once the block wears off, which may be anywhere from 8-48 hrs after surgery. Activity    Please begin using fingers immediately after surgery, working to improve motion of straightening and flexing your fingers several times per day. No lifting with your affected hand. Otherwise, you may proceed with activity as tolerated. No driving until you receive further notice otherwise. Ice and Elevation    Keep your hand elevated continuously for 48 hours after surgery using the pillow provided. Your hand/wrist should always be above the level of your heart. Sleep with your arm elevated to minimize swelling and discomfort. Continue ice consistently for 48 hours after surgery. After 48 hours, you should ice 3 times per day for 20 minutes at a time for the next 5 days. After 1 week from surgery, you may use ice as needed for pain. Diet    You may advance your regular diet as tolerated. Increase your clear liquid intake for the next 2-3 days. Medications  You will be given prescriptions for pain medication, and nausea when you are discharged from the hospital. Please use the medications as prescribed.  Pain medications may cause constipation - over the counter Colace or Milk of Magnesia may be used

## 2023-10-16 NOTE — OP NOTE
Operative Note    Preoperative Diagnosis:  DeQuervain's tenosynovitis, right    Postoperative Diagnosis: DeQuervain's tenosynovitis, right    Procedure:  DeQuervain's release, right    Surgeon: Brayden Abel MD    Assistant: Lian Oviedo PA-C    Explanation of necessity of assistant: An assistant was necessary for this case with assistance in retraction and positioning to achieve appropriate exposure during the case. Anesthesia:  Local    Estimated blood loss: Minimal.     Complications: None. Pathology: None. Findings:  Tendinosis of the 1st dorsal compartment tendons     Indications: Gaston Quiles is a 48 y.o. female who presented with right DeQuervain's tenosynovitis which has been unresponsive to nonoperative measures. After discussion of risks, including risks of bleeding, infection, damage to surrounding structures, persistent pain, stiffness, and loss of function, risks of anesthesia, and other risks, the patient elected to proceed with the above procedure and signed informed operative consent. Description of procedure: The patient was seen and identified in the preanesthesia care unit. The operative site was marked. Preoperative questions were invited and answered. The patient was then evaluated by the anesthesia team and brought to the operative suite on a stretcher . Light sedation was induced. The patient was then prepped and draped in the usual sterile fashion. A timeout was completed confirming the appropriate site, side, and procedure. Then, a field block with Marcaine 0.5% and Lidocaine 1% with epinephrine was placed over the planned incision. Then, an Esmarch bandage was used to exsanguinate the limb, and a well-padded tourniquet was inflated to 250 mmHg. A longitudinal incision was made starting from the radial styloid and proceeding proximally. The superficial radial nerve was identified and protected.  The interval between the first and second dorsal extensor retinaculum

## 2023-11-16 ENCOUNTER — TELEPHONE (OUTPATIENT)
Age: 53
End: 2023-11-16

## 2023-11-16 NOTE — TELEPHONE ENCOUNTER
Left VM for pt to call office back. If pt calls back she would like to schedule a new pt appt with Dr. Katelyn Montague. Please schedule pt next available.-TM 11/16/23

## 2023-11-16 NOTE — TELEPHONE ENCOUNTER
Pt is scheduled for New Patient 02/21/2024 with OLESYA Martell. Pt would like a medication refill for Citalopram 10MG to be sent to  [facility] [address], [place] [address]. Best call back number [phone]

## 2023-11-16 NOTE — TELEPHONE ENCOUNTER
----- Message from Shelby Marti sent at 11/16/2023  9:16 AM EST -----  Subject: Appointment Request    Reason for Call: New Patient/New to Provider Appointment needed: Routine   Physical Exam    QUESTIONS    Reason for appointment request? Requested Provider unavailable - Dr Day     Additional Information for Provider? Patient would like to establish with   Dr Day (PCP retired) no appointments available  ---------------------------------------------------------------------------  --------------  CALL BACK INFO  [phone]; OK to leave message on voicemail  ---------------------------------------------------------------------------  --------------  SCRIPT ANSWERS

## 2023-11-17 NOTE — TELEPHONE ENCOUNTER
Tried to contact pt no answer and VM not set up. If pt calls back please let her know OLESYA Martell can not prescribed medications before new pt appt in February.-TM 11/17/23

## 2023-11-21 DIAGNOSIS — M54.2 NECK PAIN: ICD-10-CM

## 2023-11-21 RX ORDER — CYCLOBENZAPRINE HCL 10 MG
10 TABLET ORAL NIGHTLY PRN
Qty: 10 TABLET | Refills: 0 | OUTPATIENT
Start: 2023-11-21 | End: 2023-12-01

## 2023-12-01 RX ORDER — CITALOPRAM HYDROBROMIDE 10 MG/1
10 TABLET ORAL DAILY
Qty: 90 TABLET | Refills: 0 | Status: SHIPPED | OUTPATIENT
Start: 2023-12-01

## 2023-12-01 NOTE — TELEPHONE ENCOUNTER
citalopram (CELEXA) 10 MG tablet    [facility] [id number] - [address] [place] [address], [address] [place] [address]

## 2024-02-21 ENCOUNTER — TELEPHONE (OUTPATIENT)
Age: 54
End: 2024-02-21

## 2024-02-21 NOTE — TELEPHONE ENCOUNTER
Left voicemail on 2/21/24 to callback the office so that she can get re-scheduled  a New Pt Appt with OLESYA Martell.

## 2024-02-21 NOTE — TELEPHONE ENCOUNTER
----- Message from Komal Youssef sent at 2/20/2024  5:01 PM EST -----  Subject: Appointment Request    Reason for Call: New Patient/New to Provider Appointment needed: New   Patient Request Appointment    QUESTIONS    Reason for appointment request? No appointments available during search     Additional Information for Provider? Edwin Lynch had to cancel her NP   appt with Susanna Martell on 2- since she was not feeling good. Please   call her back to re-schedule.  ---------------------------------------------------------------------------  --------------  CALL BACK INFO  [phone]; OK to leave message on voicemail  ---------------------------------------------------------------------------  --------------  SCRIPT ANSWERS

## 2024-02-26 RX ORDER — CITALOPRAM HYDROBROMIDE 10 MG/1
10 TABLET ORAL DAILY
Qty: 30 TABLET | Refills: 0 | Status: SHIPPED | OUTPATIENT
Start: 2024-02-26

## 2024-03-21 RX ORDER — CITALOPRAM HYDROBROMIDE 10 MG/1
10 TABLET ORAL DAILY
Qty: 30 TABLET | Refills: 0 | OUTPATIENT
Start: 2024-03-21

## 2024-03-21 NOTE — TELEPHONE ENCOUNTER
CARINE Dior 5/31/23.  She was supposed to schedule with another provider at another practice in Feb 2024. Will deny refill.

## 2024-03-27 RX ORDER — CITALOPRAM HYDROBROMIDE 10 MG/1
10 TABLET ORAL DAILY
Qty: 30 TABLET | Refills: 3 | Status: SHIPPED | OUTPATIENT
Start: 2024-03-27

## 2024-03-27 NOTE — TELEPHONE ENCOUNTER
LOV 5/31/23 with OLESYA Dior. She has appt @ [facility] on 7/24/24.  Let pt know I will send Citalopram refill until she can see new provider.

## 2024-03-27 NOTE — TELEPHONE ENCOUNTER
Pt called and said that she was a [facility] patient and found a new provider but she couldn't get an appointment until July so she wanted to know if she could get a temporary refill of her   citalopram (CELEXA) 10 MG tablet   To hold her over until that appt since she is completley out. She uses [facility] [id number] [facility] [id number] [address] [phone] - [place] [phone]

## 2024-07-03 RX ORDER — CITALOPRAM HYDROBROMIDE 10 MG/1
10 TABLET ORAL DAILY
Qty: 30 TABLET | Refills: 0 | Status: SHIPPED | OUTPATIENT
Start: 2024-07-03

## 2024-07-24 ENCOUNTER — OFFICE VISIT (OUTPATIENT)
Age: 54
End: 2024-07-24
Payer: COMMERCIAL

## 2024-07-24 VITALS
OXYGEN SATURATION: 98 % | DIASTOLIC BLOOD PRESSURE: 76 MMHG | TEMPERATURE: 97.7 F | SYSTOLIC BLOOD PRESSURE: 119 MMHG | WEIGHT: 172 LBS | BODY MASS INDEX: 29.37 KG/M2 | HEIGHT: 64 IN | HEART RATE: 68 BPM

## 2024-07-24 DIAGNOSIS — K58.2 IRRITABLE BOWEL SYNDROME WITH BOTH CONSTIPATION AND DIARRHEA: ICD-10-CM

## 2024-07-24 DIAGNOSIS — Z12.31 ENCOUNTER FOR SCREENING MAMMOGRAM FOR MALIGNANT NEOPLASM OF BREAST: ICD-10-CM

## 2024-07-24 DIAGNOSIS — Z76.89 ENCOUNTER TO ESTABLISH CARE: Primary | ICD-10-CM

## 2024-07-24 DIAGNOSIS — Z12.11 COLON CANCER SCREENING: ICD-10-CM

## 2024-07-24 DIAGNOSIS — Z86.2 HISTORY OF ANEMIA: ICD-10-CM

## 2024-07-24 DIAGNOSIS — Z11.59 NEED FOR HEPATITIS B SCREENING TEST: ICD-10-CM

## 2024-07-24 DIAGNOSIS — E78.2 MIXED HYPERLIPIDEMIA: ICD-10-CM

## 2024-07-24 DIAGNOSIS — F33.41 RECURRENT MAJOR DEPRESSIVE DISORDER, IN PARTIAL REMISSION (HCC): ICD-10-CM

## 2024-07-24 DIAGNOSIS — E07.89 OTHER SPECIFIED DISORDERS OF THYROID: ICD-10-CM

## 2024-07-24 PROBLEM — F32.A DEPRESSION: Status: ACTIVE | Noted: 2024-07-24

## 2024-07-24 LAB
T4 FREE SERPL-MCNC: 0.9 NG/DL (ref 0.8–1.5)
TSH SERPL DL<=0.05 MIU/L-ACNC: 2.63 UIU/ML (ref 0.36–3.74)

## 2024-07-24 PROCEDURE — 99204 OFFICE O/P NEW MOD 45 MIN: CPT | Performed by: NURSE PRACTITIONER

## 2024-07-24 RX ORDER — CHOLECALCIFEROL (VITAMIN D3) 25 MCG
CAPSULE ORAL
COMMUNITY

## 2024-07-24 RX ORDER — CITALOPRAM 20 MG/1
20 TABLET ORAL DAILY
Qty: 90 TABLET | Refills: 1 | Status: SHIPPED | OUTPATIENT
Start: 2024-07-24

## 2024-07-24 ASSESSMENT — ENCOUNTER SYMPTOMS
CONSTIPATION: 1
DIARRHEA: 1

## 2024-07-24 NOTE — PROGRESS NOTES
[facility]  Clinic Note     Edwin Lynch (: 1970) is a 53 y.o. female is a new patient, here for to establish care.   New Patient       ASSESSMENT/PLAN:  1. Encounter to establish care    2. Recurrent major depressive disorder, in partial remission (HCC)  -     Ambulatory referral to Social Work  -  INCREASE to   citalopram (CELEXA) 20 MG tablet; Take 1 tablet by mouth daily, Disp-90 tablet, R-1Pt has appt with new PCP 24.Normal  -     Comprehensive Metabolic Panel; Future    3. Irritable bowel syndrome with both constipation and diarrhea  -Has not had formal evaluation for this.  Would like to proceed with GI consultation as discussed.  She has done some elimination diet and has noted removal of gluten and dairy improve her symptoms.  -     [facility], [facility] ([place])  -     Comprehensive Metabolic Panel; Future    4. Colon cancer screening  -     [facility], [facility] ([facility]cd[facility])    5. Encounter for screening mammogram for malignant neoplasm of breast  -     ANGELINA DIGITAL SCREEN W OR WO CAD BILATERAL; Future    6. Mixed hyperlipidemia  -     Comprehensive Metabolic Panel; Future  -     Lipid Panel; Future  Lab Results   Component Value Date    CHOL 224 (H) 2022    TRIG 56 2022     2022    LDL 77.8 2022    VLDL 11.2 2022    CHOLHDLRATIO 1.7 2022       7. Other specified disorders of thyroid  -     TSH + Free T4 Panel; Future  -Last TSH 5.65 on 2022    8. Need for hepatitis B screening test  -     Hepatitis B Surface Antibody; Future  -     Hepatitis B Core Antibody, Total; Future  -     Hepatitis B Surface Antigen; Future    9. History of anemia  -     CBC with Auto Differential; Future          Return in about 3 months (around 10/24/2024).        SUBJECTIVE/OBJECTIVE:        History of Present Illness  Edwin Lynch is a 53-year-old female who presents to

## 2024-07-24 NOTE — PROGRESS NOTES
Chief Complaint   Patient presents with    New Patient         \"Have you been to the ER, urgent care clinic since your last visit?  Hospitalized since your last visit?\"    NO    “Have you seen or consulted any other health care providers [facility]?”    NO    Have you had a mammogram?”   NO    Date of last Mammogram: 5/2/2022      “Have you had a pap smear?”    NO    Date of last Cervical Cancer screen (HPV or PAP): 8/7/2018         “Have you had a colorectal cancer screening such as a colonoscopy/FIT/Cologuard?    NO    No colonoscopy on file  No cologuard on file  No FIT/FOBT on file   No flexible sigmoidoscopy on file         Click Here for Release of Records Request           7/24/2024     9:05 AM   PHQ-9    Little interest or pleasure in doing things 0   Feeling down, depressed, or hopeless 0   PHQ-2 Score 0   PHQ-9 Total Score 0           Financial Resource Strain: Low Risk  (7/24/2024)    Overall Financial Resource Strain (CARDIA)     Difficulty of Paying Living Expenses: Not hard at all      Food Insecurity: No Food Insecurity (7/24/2024)    Hunger Vital Sign     Worried About Running Out of Food in the Last Year: Never true     Ran Out of Food in the Last Year: Never true          Health Maintenance Due   Topic Date Due    Hepatitis B vaccine (1 of 3 - 3-dose series) Never done    HIV screen  Never done    DTaP/Tdap/Td vaccine (1 - Tdap) Never done    Colorectal Cancer Screen  Never done    Shingles vaccine (1 of 2) Never done    Cervical cancer screen  08/07/2023    COVID-19 Vaccine (3 - 2023-24 season) 09/01/2023    Breast cancer screen  05/02/2024    Depression Screen  05/31/2024

## 2024-07-26 LAB
ALBUMIN SERPL-MCNC: 4.2 G/DL (ref 3.5–5)
ALBUMIN/GLOB SERPL: 1.4 (ref 1.1–2.2)
ALP SERPL-CCNC: 84 U/L (ref 45–117)
ALT SERPL-CCNC: 29 U/L (ref 12–78)
ANION GAP SERPL CALC-SCNC: 4 MMOL/L (ref 5–15)
AST SERPL-CCNC: 15 U/L (ref 15–37)
BASOPHILS # BLD: 0.1 K/UL (ref 0–0.1)
BASOPHILS NFR BLD: 1 % (ref 0–1)
BILIRUB SERPL-MCNC: 0.5 MG/DL (ref 0.2–1)
BUN SERPL-MCNC: 21 MG/DL (ref 6–20)
BUN/CREAT SERPL: 25 (ref 12–20)
CALCIUM SERPL-MCNC: 9.6 MG/DL (ref 8.5–10.1)
CHLORIDE SERPL-SCNC: 106 MMOL/L (ref 97–108)
CHOLEST SERPL-MCNC: 238 MG/DL
CO2 SERPL-SCNC: 28 MMOL/L (ref 21–32)
CREAT SERPL-MCNC: 0.84 MG/DL (ref 0.55–1.02)
DIFFERENTIAL METHOD BLD: NORMAL
EOSINOPHIL # BLD: 0.1 K/UL (ref 0–0.4)
EOSINOPHIL NFR BLD: 1 % (ref 0–7)
ERYTHROCYTE [DISTWIDTH] IN BLOOD BY AUTOMATED COUNT: 12.8 % (ref 11.5–14.5)
GLOBULIN SER CALC-MCNC: 3 G/DL (ref 2–4)
GLUCOSE SERPL-MCNC: 89 MG/DL (ref 65–100)
HBV CORE AB SERPL QL IA: NEGATIVE
HBV SURFACE AB SER QL: NONREACTIVE
HBV SURFACE AB SER-ACNC: <3.1 MIU/ML
HBV SURFACE AG SER QL: 0.23 INDEX
HBV SURFACE AG SER QL: NEGATIVE
HCT VFR BLD AUTO: 41.7 % (ref 35–47)
HDLC SERPL-MCNC: 117 MG/DL
HDLC SERPL: 2 (ref 0–5)
HGB BLD-MCNC: 13.6 G/DL (ref 11.5–16)
IMM GRANULOCYTES # BLD AUTO: 0 K/UL (ref 0–0.04)
IMM GRANULOCYTES NFR BLD AUTO: 0 % (ref 0–0.5)
LDLC SERPL CALC-MCNC: 108.8 MG/DL (ref 0–100)
LYMPHOCYTES # BLD: 1.5 K/UL (ref 0.8–3.5)
LYMPHOCYTES NFR BLD: 30 % (ref 12–49)
MCH RBC QN AUTO: 30.3 PG (ref 26–34)
MCHC RBC AUTO-ENTMCNC: 32.6 G/DL (ref 30–36.5)
MCV RBC AUTO: 92.9 FL (ref 80–99)
MONOCYTES # BLD: 0.5 K/UL (ref 0–1)
MONOCYTES NFR BLD: 10 % (ref 5–13)
NEUTS SEG # BLD: 2.9 K/UL (ref 1.8–8)
NEUTS SEG NFR BLD: 58 % (ref 32–75)
NRBC # BLD: 0 K/UL (ref 0–0.01)
NRBC BLD-RTO: 0 PER 100 WBC
PLATELET # BLD AUTO: 201 K/UL (ref 150–400)
PMV BLD AUTO: 10.4 FL (ref 8.9–12.9)
POTASSIUM SERPL-SCNC: 4.2 MMOL/L (ref 3.5–5.1)
PROT SERPL-MCNC: 7.2 G/DL (ref 6.4–8.2)
RBC # BLD AUTO: 4.49 M/UL (ref 3.8–5.2)
SODIUM SERPL-SCNC: 138 MMOL/L (ref 136–145)
TRIGL SERPL-MCNC: 61 MG/DL
VLDLC SERPL CALC-MCNC: 12.2 MG/DL
WBC # BLD AUTO: 5.1 K/UL (ref 3.6–11)

## 2024-08-13 RX ORDER — NALOXONE HYDROCHLORIDE 0.4 MG/ML
INJECTION, SOLUTION INTRAMUSCULAR; INTRAVENOUS; SUBCUTANEOUS PRN
Status: CANCELLED | OUTPATIENT
Start: 2024-08-13

## 2024-08-13 RX ORDER — MIDAZOLAM HYDROCHLORIDE 2 MG/2ML
2 INJECTION, SOLUTION INTRAMUSCULAR; INTRAVENOUS
Status: CANCELLED | OUTPATIENT
Start: 2024-08-13 | End: 2024-08-14

## 2024-08-13 RX ORDER — SODIUM CHLORIDE, SODIUM LACTATE, POTASSIUM CHLORIDE, CALCIUM CHLORIDE 600; 310; 30; 20 MG/100ML; MG/100ML; MG/100ML; MG/100ML
INJECTION, SOLUTION INTRAVENOUS CONTINUOUS
Status: CANCELLED | OUTPATIENT
Start: 2024-08-13

## 2024-08-13 RX ORDER — ONDANSETRON 2 MG/ML
4 INJECTION INTRAMUSCULAR; INTRAVENOUS
Status: CANCELLED | OUTPATIENT
Start: 2024-08-13 | End: 2024-08-14

## 2024-08-13 RX ORDER — DIPHENHYDRAMINE HYDROCHLORIDE 50 MG/ML
12.5 INJECTION INTRAMUSCULAR; INTRAVENOUS
Status: CANCELLED | OUTPATIENT
Start: 2024-08-13 | End: 2024-08-14

## 2024-08-13 RX ORDER — LIDOCAINE HYDROCHLORIDE 10 MG/ML
1 INJECTION, SOLUTION EPIDURAL; INFILTRATION; INTRACAUDAL; PERINEURAL
Status: CANCELLED | OUTPATIENT
Start: 2024-08-13 | End: 2024-08-14

## 2024-08-13 RX ORDER — FENTANYL CITRATE 50 UG/ML
100 INJECTION, SOLUTION INTRAMUSCULAR; INTRAVENOUS
Status: CANCELLED | OUTPATIENT
Start: 2024-08-13 | End: 2024-08-14

## 2024-08-13 NOTE — DISCHARGE INSTRUCTIONS
Upper Extremity Surgery Discharge Instructions  Dr. Nestor Jordan / Rebecca Stringer PA-C        Please take the time to review the following instructions before you leave the surgical center and use them as guidelines during your recovery from surgery. If you have any questions, the most efficient way to contact us is via the messaging portal on [employer]. The [facility] team works on checking and answering these messages throughout the workday to get back with you as quickly as possible. After hours and on weekends (outside of 8am-5pm Monday-Friday) please call [phone] to speak with the on-call provider.      Wound Care / Dressing Change   Do NOT remove your dressing or get them wet.       Showering / Bathing   You may bathe/shower as long as dressing/splint/cast is kept dry.        Sling   If you had a nerve block done (the entire arm is numb), please wear a sling for comfort and support until the nerve block wears off. Once the nerve block has worn off and you are able to move the arm again, you are not required to wear a sling and should only do so as needed for comfort.        Activity   No lifting with affected hand.   Please begin using fingers immediately after surgery, working to improve finger motion.       Ice and Elevation   Please use ice consistently over the operative site for 48 hours after surgery. After 48 hours, you should ice 3 times per day for 20 minutes at a time for the next 5 days. One week after surgery, you may use ice as needed for pain.        Diet   You may advance your regular diet as tolerated. Increase your clear liquid intake for the next 2-3 days.        Driving  Please do NOT drive while taking prescription pain medication (opioids).   Please do NOT drive during the post-operative period during which you are in your post-op dressing/splint/cast. While it is not illegal to drive with an injured or post-surgical hand/arm, one often has delayed reaction

## 2024-08-16 ENCOUNTER — HOSPITAL ENCOUNTER (OUTPATIENT)
Facility: HOSPITAL | Age: 54
Setting detail: OUTPATIENT SURGERY
Discharge: HOME | End: 2024-08-16
Attending: ORTHOPAEDIC SURGERY | Admitting: ORTHOPAEDIC SURGERY
Payer: COMMERCIAL

## 2024-08-16 VITALS
TEMPERATURE: 97.7 F | HEIGHT: 64 IN | WEIGHT: 169.75 LBS | RESPIRATION RATE: 1 BRPM | HEART RATE: 68 BPM | DIASTOLIC BLOOD PRESSURE: 80 MMHG | BODY MASS INDEX: 28.98 KG/M2 | OXYGEN SATURATION: 96 % | SYSTOLIC BLOOD PRESSURE: 136 MMHG

## 2024-08-16 DIAGNOSIS — M65.4 DE QUERVAIN'S TENOSYNOVITIS, LEFT: Primary | ICD-10-CM

## 2024-08-16 PROCEDURE — 3600000002 HC SURGERY LEVEL 2 BASE: Performed by: ORTHOPAEDIC SURGERY

## 2024-08-16 PROCEDURE — 2580000003 HC RX 258: Performed by: ORTHOPAEDIC SURGERY

## 2024-08-16 PROCEDURE — 2500000003 HC RX 250 WO HCPCS: Performed by: ORTHOPAEDIC SURGERY

## 2024-08-16 PROCEDURE — 6360000002 HC RX W HCPCS: Performed by: ORTHOPAEDIC SURGERY

## 2024-08-16 PROCEDURE — 2709999900 HC NON-CHARGEABLE SUPPLY: Performed by: ORTHOPAEDIC SURGERY

## 2024-08-16 PROCEDURE — 7100000010 HC PHASE II RECOVERY - FIRST 15 MIN: Performed by: ORTHOPAEDIC SURGERY

## 2024-08-16 PROCEDURE — 3600000012 HC SURGERY LEVEL 2 ADDTL 15MIN: Performed by: ORTHOPAEDIC SURGERY

## 2024-08-16 RX ORDER — DOCUSATE SODIUM 100 MG/1
100 CAPSULE, LIQUID FILLED ORAL DAILY PRN
Qty: 30 CAPSULE | Refills: 0 | Status: SHIPPED | OUTPATIENT
Start: 2024-08-16

## 2024-08-16 RX ORDER — ONDANSETRON 4 MG/1
4 TABLET, ORALLY DISINTEGRATING ORAL 3 TIMES DAILY PRN
Qty: 21 TABLET | Refills: 0 | Status: SHIPPED | OUTPATIENT
Start: 2024-08-16 | End: 2024-08-23

## 2024-08-16 RX ORDER — TRAMADOL HYDROCHLORIDE 50 MG/1
50-100 TABLET ORAL EVERY 6 HOURS PRN
Qty: 30 TABLET | Refills: 0 | Status: SHIPPED | OUTPATIENT
Start: 2024-08-16 | End: 2024-08-21

## 2024-08-16 ASSESSMENT — PAIN DESCRIPTION - DESCRIPTORS: DESCRIPTORS: ACHING

## 2024-08-16 ASSESSMENT — PAIN - FUNCTIONAL ASSESSMENT
PAIN_FUNCTIONAL_ASSESSMENT: ACTIVITIES ARE NOT PREVENTED
PAIN_FUNCTIONAL_ASSESSMENT: 0-10

## 2024-08-16 NOTE — OP NOTE
Operative Report     Preoperative Diagnosis:  Left  De Quervain tenosynovitis      Postoperative Diagnosis:  Left De Quervain tenosynovitis      Procedure(s):  Left De Quervain release     Surgeon: Nestor Jordan MD      Assistant: Rebecca Stringer PA-C     Explanation of necessity of assistant: An assistant was necessary for this case with assistance in retraction and positioning to achieve appropriate exposure during the case     Anesthesia:  Local     Estimated Blood Loss:  Minimal     Specimens:  None     Findings:  Tendinosis of the 1st dorsal compartment tendons      Complications:  None      Implants:  None     Indication for procedure: Edwin Lynch is a 53 y.o. female  who presented with pain associated with De Quervain tenosynovitis that was refractory to non operative treatment  After discussion of risks, benefits, and alternatives to surgery, the patient elected to proceed with the above procedure and signed operative consent.  Particular risks discussed included risks of bleeding, infection, damage to surrounding structures, failure to relieve symptoms, persistent pain, stiffness, and loss of function, risks of anesthesia, and other risks.     Description of procedure: The patient was seen and identified in the preanesthesia care unit. The operative site was marked. Preoperative questions were invited and answered. Then, a field block with Marcaine 0.5% and Lidocaine 1% with epinephrine was placed over the planned incision. The patient was then brought to the operative suite on a stretcher.The patient was then prepped and draped in the usual sterile fashion. . A timeout was completed confirming the appropriate site, side, and procedure. Then, an Esmarch bandage was used to exsanguinate the limb, and a well-padded tourniquet was inflated to 250 mmHg.      A longitudinal incision was made starting from the radial styloid and proceeding proximally.  The superficial radial nerve was identified and

## 2024-08-16 NOTE — H&P
Orthopedic Admission History and Physical        NAME: Edwin Lynch       :  1970       MRN:  [id number]      Subjective:     Patient is a 53 y.o. female who presents with history of left de Quervain's tenosynovitis.  Presents today for surgical treatment.    Patient Active Problem List    Diagnosis Date Noted    Depression 2024    Uterine fibroid 2018    Lichen sclerosus 2018     Past Medical History:   Diagnosis Date    Depression     Fibroid, uterine     Gluten intolerance     Lichen sclerosus       Past Surgical History:   Procedure Laterality Date    ANAL FISTULOTOMY       SECTION  ,     WRIST SURGERY Right 10/16/2023    RIGHT DEQUERVAINS RELEASE performed by Nestor Jordan MD at [facility] AMBUL[facility]ORY OR      Prior to Admission medications    Medication Sig Start Date End Date Taking? Authorizing Provider   traMADol (ULTRAM) 50 MG tablet Take 1-2 tablets by mouth every 6 hours as needed for Pain for up to 5 days. Intended supply: 3 days. Take lowest dose possible to manage pain Max Daily Amount: 400 mg 24 Yes Rebecca Stringer PA-C   docusate sodium (COLACE) 100 MG capsule Take 1 capsule by mouth daily as needed for Constipation 24  Yes Rebecca Stringer PA-C   ondansetron (ZOFRAN-ODT) 4 MG disintegrating tablet Take 1 tablet by mouth 3 times daily as needed for Nausea or Vomiting 24 Yes Rebecca Stringer PA-C   Cholecalciferol (VITAMIN D-3) 25 MCG (1000 UT) CAPS Take by mouth Daily   Yes Shay De La Cruz MD   CALCIUM PO Take by mouth Daily   Yes Shay De La Cruz MD   citalopram (CELEXA) 20 MG tablet Take 1 tablet by mouth daily 24  Yes Susanna Martell, APRN - NP   docusate sodium (COLACE) 100 MG capsule Take 1 capsule by mouth daily as needed for Constipation 10/16/23   Rebecca Stringer PA-C   ondansetron (ZOFRAN) 4 MG tablet Take 1 tablet by mouth 3 times daily as needed for Nausea or Vomiting 10/16/23   Siomara

## 2024-08-16 NOTE — BRIEF OP NOTE
Brief Postoperative Note      Patient: Edwin Lynch  YOB: 1970  MRN: [id number]    Date of Procedure: 8/16/2024    Pre-Op Diagnosis Codes:      * Tenosynovitis, de Quervain [M65.4]    Post-Op Diagnosis: Same       Procedure(s):  LEFT DEQUERVAINS RELEASE    Surgeon(s):  Nestor Jordan MD    Assistant:  Physician Assistant: Rebecca Stringer PA-C    Anesthesia: Local    Estimated Blood Loss (mL): Minimal    Complications: None    Specimens:   * No specimens in log *    Implants:  * No implants in log *      Drains: * No LDAs found *    Findings:  Infection Present At Time Of Surgery (PATOS) (choose all levels that have infection present):  No infection present  Other Findings: As above.    Electronically signed by Rebecca Stringer PA-C on 8/16/2024 at 1:38 PM

## 2024-09-09 ENCOUNTER — OFFICE VISIT (OUTPATIENT)
Age: 54
End: 2024-09-09
Payer: COMMERCIAL

## 2024-09-09 DIAGNOSIS — F41.9 ANXIETY: Primary | ICD-10-CM

## 2024-09-09 PROCEDURE — 90791 PSYCH DIAGNOSTIC EVALUATION: CPT | Performed by: SOCIAL WORKER

## 2024-10-09 ENCOUNTER — OFFICE VISIT (OUTPATIENT)
Age: 54
End: 2024-10-09
Payer: COMMERCIAL

## 2024-10-09 DIAGNOSIS — F41.9 ANXIETY: Primary | ICD-10-CM

## 2024-10-09 DIAGNOSIS — F32.0 CURRENT MILD EPISODE OF MAJOR DEPRESSIVE DISORDER WITHOUT PRIOR EPISODE (HCC): ICD-10-CM

## 2024-10-09 PROCEDURE — 90837 PSYTX W PT 60 MINUTES: CPT | Performed by: SOCIAL WORKER

## 2024-10-09 NOTE — PSYCHOTHERAPY
In office -Follow Up    Time Start:4:00 pm  Time End:5:01 pm    DX:    Diagnosis Orders   1. Anxiety        2. Current mild episode of major depressive disorder without prior episode (HCC)             Met with Ms. Lynch today for our follow up appt. minimal change--This patient continues to report anxiety but am seeing more depressive symptoms now.  Ms. Lynch and her  just got back from [facility] where they were clearing out her mother's rental property after her death.  Going thru the house and seeing furniture, etc, has brought back memories of the patient's childhood.  Although her brother lives near by, he does not participate in dealing with the house.  The patient and her brother are not close and apparently have never been.  The patient states that she was the \"outcast\" in the family as her mother treasured the brother and thought Ms. Lynch was \"weird.\"  Ms. Lynch states that she grew up feeling very detached from her family and never had a close relationship with any of her family members.  Her father committed suicide when she was young and he also apparently was not very responsible as a father/ as he would spend the family's money without thinking of the consequences.  The patient states today that she is trying to figure out how to connect all of her childhood experiences and how they all have made her into who she is today.  Ms. Lynch's homework is to be more mindful of time for herself, working on relationships/hobbies outside of her marriage and figuring out what she wants to address in therapy in regards to her childhood.  No acute symptoms reported or observed.      We did set a follow-up appointment with this clinician.      Follow-up appt date (if applicable) is:  2 weeks in office    Janie Love LCSW

## 2024-10-09 NOTE — PROGRESS NOTES
In office Follow Up Billing    Session Time     Start Time:    4:00 pm  Finish Time:  5:01 pm    Total time spent for this encounter:  61 minutes    We did set a follow-up appointment with this clinician.      Return in about 2 weeks (around 10/23/2024).     Therapy Modalities   Building Self Esteem, Building Insight, Cognitive , Client Empowerment, Mindfulness, and Stress Management    Assessment / Plan     Psychotherapy Target Symptoms:  anxiety and depressed mood    Assessment:   minimal change --This patient continues to report anxiety but am seeing more depressive symptoms now.  Ms. Lnych and her  just got back from [facility] where they were clearing out her mother's rental property after her death.  Going thru the house and seeing furniture, etc, has brought back memories of the patient's childhood.  Although her brother lives near by, he does not participate in dealing with the house.  The patient and her brother are not close and apparently have never been.  The patient states that she was the \"outcast\" in the family as her mother treasured the brother and thought Ms. Lynch was \"weird.\"  Ms. Lynch states that she grew up feeling very detached from her family and never had a close relationship with any of her family members.  Her father committed suicide when she was young and he also apparently was not very responsible as a father/ as he would spend the family's money without thinking of the consequences.  The patient states today that she is trying to figure out how to connect all of her childhood experiences and how they all have made her into who she is today.  Ms. Lynch's homework is to be more mindful of time for herself, working on relationships/hobbies outside of her marriage and figuring out what she wants to address in therapy in regards to her childhood.  No acute symptoms reported or observed.    Plan:  continue psychotherapy    1. Anxiety  2. Current mild episode of major

## 2024-10-23 ENCOUNTER — OFFICE VISIT (OUTPATIENT)
Age: 54
End: 2024-10-23
Payer: COMMERCIAL

## 2024-10-23 DIAGNOSIS — F32.0 CURRENT MILD EPISODE OF MAJOR DEPRESSIVE DISORDER WITHOUT PRIOR EPISODE (HCC): Primary | ICD-10-CM

## 2024-10-23 PROCEDURE — 90837 PSYTX W PT 60 MINUTES: CPT | Performed by: SOCIAL WORKER

## 2024-10-23 NOTE — PROGRESS NOTES
In office Follow Up Billing    Session Time     Start Time:    4:00 pm  Finish Time:  4:56 pm    Total time spent for this encounter: 56 minutes    We did set a follow-up appointment with this clinician.      Return in about 4 weeks (around 11/20/2024).     Therapy Modalities   Cognitive , Client Empowerment, Mindfulness, and Stress Management    Assessment / Plan     Psychotherapy Target Symptoms:  depressed mood and feelings of helplessness /  hopefulness    Assessment:   minimal progress-  This patient comes in today reporting that she has been thinking about the holidays as she experienced as a child.  She remembers the tension between her parents, her father's alcohol use and the financial burdens that her father had to buy presents.  Ms. Lynch does not want to have the \"grand\" holidays that her  wants with his family.  She states that she ends up preparing lots of homemade foods, etc. And due to her immunosuppressant issues, she can't eat 1/2 the foods she makes.  The family members don't bring any contributions to the meals and don't assist with the clean up, etc.  Ms. Lynch would like to simplify these times of year and perhaps go away with her .  She has discussed her wishes recently and plans to discuss more in the next few weeks so that she can feel more relaxed and \"joyful\" this year.  No acute symptoms reported or observed.    Plan:  continue psychotherapy    1. Current mild episode of major depressive disorder without prior episode (HCC)       --Janie Love LCSW on 10/23/2024 at 4:57 PM    An electronic signature was used to authenticate this note.

## 2024-10-23 NOTE — PSYCHOTHERAPY
In office -Follow Up    Time Start:4:00 pm  Time End:4:56 pm    DX:    Diagnosis Orders   1. Current mild episode of major depressive disorder without prior episode (HCC)             Met with Ms. Lynch today for our follow up appt. minimal progress-  This patient comes in today reporting that she has been thinking about the holidays as she experienced as a child.  She remembers the tension between her parents, her father's alcohol use and the financial burdens that her father had to buy presents.  Ms. Lynch does not want to have the \"grand\" holidays that her  wants with his family.  She states that she ends up preparing lots of homemade foods, etc. And due to her immunosuppressant issues, she can't eat 1/2 the foods she makes.  The family members don't bring any contributions to the meals and don't assist with the clean up, etc.  Ms. Lynch would like to simplify these times of year and perhaps go away with her .  She has discussed her wishes recently and plans to discuss more in the next few weeks so that she can feel more relaxed and \"joyful\" this year.  No acute symptoms reported or observed.    We did set a follow-up appointment with this clinician.      Follow-up appt date (if applicable) is:  November 20 and Dec. 18 @ 4 in office.    Janie Love LCSW

## 2024-10-24 ENCOUNTER — OFFICE VISIT (OUTPATIENT)
Age: 54
End: 2024-10-24
Payer: COMMERCIAL

## 2024-10-24 VITALS
DIASTOLIC BLOOD PRESSURE: 75 MMHG | RESPIRATION RATE: 12 BRPM | TEMPERATURE: 97.5 F | WEIGHT: 171.6 LBS | SYSTOLIC BLOOD PRESSURE: 113 MMHG | HEART RATE: 71 BPM | OXYGEN SATURATION: 98 % | HEIGHT: 64 IN | BODY MASS INDEX: 29.3 KG/M2

## 2024-10-24 DIAGNOSIS — L90.0 LICHEN SCLEROSUS: ICD-10-CM

## 2024-10-24 DIAGNOSIS — N89.8 VAGINAL DRYNESS: ICD-10-CM

## 2024-10-24 DIAGNOSIS — G47.9 SLEEP DISTURBANCE: ICD-10-CM

## 2024-10-24 DIAGNOSIS — N95.1 MENOPAUSAL VASOMOTOR SYNDROME: Primary | ICD-10-CM

## 2024-10-24 DIAGNOSIS — E78.2 MIXED HYPERLIPIDEMIA: ICD-10-CM

## 2024-10-24 DIAGNOSIS — F33.41 RECURRENT MAJOR DEPRESSIVE DISORDER, IN PARTIAL REMISSION (HCC): ICD-10-CM

## 2024-10-24 DIAGNOSIS — Z28.21 INFLUENZA VACCINATION DECLINED: ICD-10-CM

## 2024-10-24 DIAGNOSIS — Z01.84 LACK OF IMMUNITY TO HEPATITIS B VIRUS DEMONSTRATED BY SEROLOGIC TEST: ICD-10-CM

## 2024-10-24 PROCEDURE — 99214 OFFICE O/P EST MOD 30 MIN: CPT | Performed by: NURSE PRACTITIONER

## 2024-10-24 RX ORDER — ESTRADIOL 0.1 MG/G
CREAM VAGINAL
Qty: 42.5 G | Refills: 0 | Status: SHIPPED | OUTPATIENT
Start: 2024-10-24

## 2024-10-24 RX ORDER — CITALOPRAM HYDROBROMIDE 20 MG/1
20 TABLET ORAL DAILY
Qty: 90 TABLET | Refills: 3 | Status: SHIPPED | OUTPATIENT
Start: 2024-10-24

## 2024-10-24 NOTE — PROGRESS NOTES
[facility]  Clinic Note     Edwin Lynch (: 1970) is a 54 y.o. female, established patient, here for evaluation of the following chief complaint(s):  Follow-up (3 mnth)       ASSESSMENT/PLAN:    1. Menopausal vasomotor syndrome  - She experiences night sweats and hot flashes. The use of black cohosh as a supplement was discussed.   - Consider SNRI or veozah    2. Lichen sclerosus  - Follow up w/ GYN    3. Lack of immunity to hepatitis B virus demonstrated by serologic test  - Will consider Hep B vaccine    4. Sleep disturbance  -     [facility] - Nadja Huerta MD, Sleep Medicine, [place]  - Continue good sleep hygiene  - Melatonin PRN    5. Influenza vaccination declined    6. Recurrent major depressive disorder, in partial remission (HCC)  - Improving  -     citalopram (CELEXA) 20 MG tablet; Take 1 tablet by mouth daily, Disp-90 tablet, R-3Normal    7. Vaginal dryness  -   RESTART  estradiol (ESTRACE VAGINAL) 0.1 MG/GM vaginal cream; 1 g/day intravaginally for 2 weeks, then 500 mg three times per week, Disp-42.5 g, R-0Normal      8. Mixed hyperlipidemia  - Diet and lifestyle modification encouraged for weight loss and chronic disease prevention/ management  Lab Results   Component Value Date    CHOL 238 (H) 2024    TRIG 61 2024     2024    .8 (H) 2024    VLDL 12.2 2024    CHOLHDLRATIO 2.0 2024                 Return in about 1 year (around 10/24/2025) for as needed or if no improvement.              SUBJECTIVE/OBJECTIVE:    History of Present Illness  Edwin Lynch is a 54-year-old female following up for depression, IBS, and hyperlipidemia.    She has been managing her conditions with the help of Janie, who has provided her with clear and actionable advice. Her anxiety has improved with an increased dose of citalopram (20 mg). She is still grieving the loss of her mother and dealing with the finalization of her house, which has been

## 2024-10-24 NOTE — PROGRESS NOTES
Chief Complaint   Patient presents with    Follow-up     3 mnth         \"Have you been to the ER, urgent care clinic since your last visit?  Hospitalized since your last visit?\"    No    “Have you seen or consulted any other health care providers outside of [facility] since your last visit?”    [facility]    Have you had a mammogram?”   NO    Date of last Mammogram: 5/2/2022      “Have you had a pap smear?”    YES - Where: with [facility] Nurse/CMA to request most recent records if not in the chart    Date of last Cervical Cancer screen (HPV or PAP): 8/7/2018         “Have you had a colorectal cancer screening such as a colonoscopy/FIT/Cologuard?    NO; scheduled for one    No colonoscopy on file  No cologuard on file  No FIT/FOBT on file   No flexible sigmoidoscopy on file         Click Here for Release of Records Request           7/24/2024     9:05 AM   PHQ-9    Little interest or pleasure in doing things 0   Feeling down, depressed, or hopeless 0   PHQ-2 Score 0   PHQ-9 Total Score 0           Financial Resource Strain: Low Risk  (7/24/2024)    Overall Financial Resource Strain (CARDIA)     Difficulty of Paying Living Expenses: Not hard at all      Food Insecurity: No Food Insecurity (7/24/2024)    Hunger Vital Sign     Worried About Running Out of Food in the Last Year: Never true     Ran Out of Food in the Last Year: Never true          Health Maintenance Due   Topic Date Due    HIV screen  Never done    Hepatitis B vaccine (1 of 3 - 19+ 3-dose series) Never done    DTaP/Tdap/Td vaccine (1 - Tdap) Never done    Colorectal Cancer Screen  Never done    Shingles vaccine (1 of 2) Never done    Cervical cancer screen  08/07/2023    Breast cancer screen  05/02/2024    Flu vaccine (1) Never done    COVID-19 Vaccine (3 - 2023-24 season) 09/01/2024

## 2025-01-07 ENCOUNTER — APPOINTMENT (OUTPATIENT)
Facility: HOSPITAL | Age: 55
End: 2025-01-07
Payer: COMMERCIAL

## 2025-01-07 ENCOUNTER — HOSPITAL ENCOUNTER (EMERGENCY)
Facility: HOSPITAL | Age: 55
Discharge: HOME | End: 2025-01-07
Attending: EMERGENCY MEDICINE
Payer: COMMERCIAL

## 2025-01-07 VITALS
RESPIRATION RATE: 20 BRPM | HEART RATE: 81 BPM | BODY MASS INDEX: 29.02 KG/M2 | WEIGHT: 170 LBS | OXYGEN SATURATION: 97 % | TEMPERATURE: 98 F | SYSTOLIC BLOOD PRESSURE: 138 MMHG | HEIGHT: 64 IN | DIASTOLIC BLOOD PRESSURE: 82 MMHG

## 2025-01-07 DIAGNOSIS — S42.295A OTHER CLOSED NONDISPLACED FRACTURE OF PROXIMAL END OF LEFT HUMERUS, INITIAL ENCOUNTER: Primary | ICD-10-CM

## 2025-01-07 PROCEDURE — 96375 TX/PRO/DX INJ NEW DRUG ADDON: CPT

## 2025-01-07 PROCEDURE — 96374 THER/PROPH/DIAG INJ IV PUSH: CPT

## 2025-01-07 PROCEDURE — 73030 X-RAY EXAM OF SHOULDER: CPT

## 2025-01-07 PROCEDURE — 6360000002 HC RX W HCPCS: Performed by: EMERGENCY MEDICINE

## 2025-01-07 PROCEDURE — 99284 EMERGENCY DEPT VISIT MOD MDM: CPT

## 2025-01-07 RX ORDER — OXYCODONE HYDROCHLORIDE 5 MG/1
5 TABLET ORAL EVERY 6 HOURS PRN
Qty: 11 TABLET | Refills: 0 | Status: SHIPPED | OUTPATIENT
Start: 2025-01-07 | End: 2025-01-14

## 2025-01-07 RX ORDER — ONDANSETRON 2 MG/ML
4 INJECTION INTRAMUSCULAR; INTRAVENOUS
Status: COMPLETED | OUTPATIENT
Start: 2025-01-07 | End: 2025-01-07

## 2025-01-07 RX ORDER — KETOROLAC TROMETHAMINE 30 MG/ML
15 INJECTION, SOLUTION INTRAMUSCULAR; INTRAVENOUS
Status: COMPLETED | OUTPATIENT
Start: 2025-01-07 | End: 2025-01-07

## 2025-01-07 RX ORDER — MORPHINE SULFATE 4 MG/ML
4 INJECTION, SOLUTION INTRAMUSCULAR; INTRAVENOUS
Status: COMPLETED | OUTPATIENT
Start: 2025-01-07 | End: 2025-01-07

## 2025-01-07 RX ORDER — ONDANSETRON 4 MG/1
4 TABLET, ORALLY DISINTEGRATING ORAL EVERY 8 HOURS PRN
Qty: 12 TABLET | Refills: 0 | Status: SHIPPED | OUTPATIENT
Start: 2025-01-07

## 2025-01-07 RX ADMIN — ONDANSETRON 4 MG: 2 INJECTION INTRAMUSCULAR; INTRAVENOUS at 13:17

## 2025-01-07 RX ADMIN — MORPHINE SULFATE 4 MG: 4 INJECTION, SOLUTION INTRAMUSCULAR; INTRAVENOUS at 13:18

## 2025-01-07 RX ADMIN — HYDROMORPHONE HYDROCHLORIDE 1 MG: 1 INJECTION, SOLUTION INTRAMUSCULAR; INTRAVENOUS; SUBCUTANEOUS at 15:21

## 2025-01-07 RX ADMIN — KETOROLAC TROMETHAMINE 15 MG: 30 INJECTION, SOLUTION INTRAMUSCULAR at 15:21

## 2025-01-07 ASSESSMENT — PAIN DESCRIPTION - LOCATION
LOCATION: SHOULDER
LOCATION: SHOULDER

## 2025-01-07 ASSESSMENT — PAIN SCALES - GENERAL
PAINLEVEL_OUTOF10: 10
PAINLEVEL_OUTOF10: 10

## 2025-01-07 ASSESSMENT — PAIN DESCRIPTION - FREQUENCY: FREQUENCY: CONTINUOUS

## 2025-01-07 ASSESSMENT — PAIN - FUNCTIONAL ASSESSMENT
PAIN_FUNCTIONAL_ASSESSMENT: 0-10
PAIN_FUNCTIONAL_ASSESSMENT: PREVENTS OR INTERFERES WITH ALL ACTIVE AND SOME PASSIVE ACTIVITIES
PAIN_FUNCTIONAL_ASSESSMENT: PREVENTS OR INTERFERES SOME ACTIVE ACTIVITIES AND ADLS

## 2025-01-07 ASSESSMENT — PAIN DESCRIPTION - PAIN TYPE: TYPE: ACUTE PAIN

## 2025-01-07 ASSESSMENT — PAIN DESCRIPTION - ORIENTATION
ORIENTATION: LEFT
ORIENTATION: LEFT

## 2025-01-07 ASSESSMENT — PAIN DESCRIPTION - DESCRIPTORS
DESCRIPTORS: SORE;SHARP
DESCRIPTORS: SHARP

## 2025-01-07 NOTE — DISCHARGE INSTR - COC
Continuity of Care Form    Patient Name: Edwin Lynch   :  1970  MRN:  [id number]    Admit date:  2025  Discharge date:  ***    Code Status Order: No Order   Advance Directives:   Advance Care Flowsheet Documentation             Admitting Physician:  No admitting provider for patient encounter.  PCP: Susanna Martell APRN - NP    Discharging Nurse: ***  Discharging Hospital Unit/Room#: D/HD  Discharging Unit Phone Number: ***    Emergency Contact:   Extended Emergency Contact Information  Primary Emergency Contact: Dale Woods  Home Phone: [phone]  Mobile Phone: [phone]  Relation: Spouse    Past Surgical History:  Past Surgical History:   Procedure Laterality Date    ANAL FISTULOTOMY       SECTION  ,     WRIST SURGERY Right 10/16/2023    RIGHT DEQUERVAINS RELEASE performed by Nestor Jordan MD at [facility] AMBULATORY OR    WRIST SURGERY N/A 2024    LEFT DEQUERVAINS RELEASE performed by Nestor Jordan MD at [facility] AMBULATORY OR       Immunization History:   Immunization History   Administered Date(s) Administered    COVID-19, MODERNA BLUE border, Primary or Immunocompromised, (age 12y+), IM, 100 mcg/0.5mL 2021, 2021       Active Problems:  Patient Active Problem List   Diagnosis Code    Lichen sclerosus L90.0    Uterine fibroid D25.9    Depression F32.A       Isolation/Infection:   Isolation            No Isolation          Patient Infection Status       None to display            Nurse Assessment:  Last Vital Signs: /82   Pulse 81   Temp 98 °F (36.7 °C) (Oral)   Resp 20   Ht 1.626 m (5' 4\")   Wt 77.1 kg (170 lb)   SpO2 97%   BMI 29.18 kg/m²     Last documented pain score (0-10 scale): Pain Level: 10  Last Weight:   Wt Readings from Last 1 Encounters:   25 77.1 kg (170 lb)     Mental Status:  {IP PT MENTAL STATUS:}    IV Access:  { CAROLINE IV ACCESS:[id number]}    Nursing Mobility/ADLs:  Walking   {[facility] DME ADLs:[id number]}  Transfer  {[facility] DME

## 2025-01-07 NOTE — ED TRIAGE NOTES
Patient presents to ED via EMS with c/o slipping on ice, landed on butt, denies  LOC, left shoulder pain. Palpable radial pulse    Patient alert and oriented, skin w/p/d, in NAD, resprs easy unlabored.

## 2025-01-07 NOTE — ED PROVIDER NOTES
[facility] EMERGENCY DEP  EMERGENCY DEPARTMENT ENCOUNTER        CHIEF COMPLAINT       Chief Complaint   Patient presents with    Shoulder Injury         HISTORY OF PRESENT ILLNESS      Healthy 54y L-handed F here with L shoulder pain. Slipped on ice and fell backwards onto her back. Didn't hit her head. No LOC. Has L shoulder pain. No hx of similar pain in the past or other shoulder problems. Normal sensation distally. Can move her fingers. Hurts to move the arm in any direction.    Review of External Medical Records:     Nursing Notes were reviewed.    REVIEW OF SYSTEMS         Review of Systems   Constitutional: (-) weight loss.   HEENT: (-) stiff neck   Eyes: (-) discharge.   Respiratory: (-) cough.    Cardiovascular: (-) syncope.   Gastrointestinal: (-) blood in stool.   Genitourinary: (-) hematuria.  Musculoskeletal: (-) myalgias.   Neurological: (-) seizure.   Skin: (-) petechiae  Lymph/Immunologic: (-) enlarged lymph nodes  All other systems reviewed and are negative.          PAST MEDICAL HISTORY     Past Medical History:   Diagnosis Date    Depression     Fibroid, uterine     Gluten intolerance     Lichen sclerosus          SURGICAL HISTORY       Past Surgical History:   Procedure Laterality Date    ANAL FISTULOTOMY       SECTION  ,     WRIST SURGERY Right 10/16/2023    RIGHT DEQUERVAINS RELEASE performed by Nestor Jordan MD at [facility] AMBULATORY OR    WRIST SURGERY N/A 2024    LEFT DEQUERVAINS RELEASE performed by Nestor Jordan MD at [facility] AMBULATORY OR         ALLERGIES     Codeine    FAMILY HISTORY       Family History   Problem Relation Age of Onset    Colon Cancer Maternal Grandmother     High Blood Pressure Maternal Grandmother     Osteoporosis Maternal Grandmother     Stroke Maternal Grandmother     Colon Cancer Maternal Grandfather     Osteoporosis Mother     Diabetes Father     Hypertension Father     High Blood Pressure Father     Obesity Father     Breast Cancer Paternal Aunt

## 2025-01-08 ENCOUNTER — TELEPHONE (OUTPATIENT)
Age: 55
End: 2025-01-08

## 2025-01-08 NOTE — TELEPHONE ENCOUNTER
Patient called stated she fell on yesterday a broke in left shoulder    Best call back #[phone]

## 2025-07-14 ENCOUNTER — HOSPITAL ENCOUNTER (OUTPATIENT)
Facility: HOSPITAL | Age: 55
Setting detail: OUTPATIENT SURGERY
Discharge: HOME | End: 2025-07-14
Attending: INTERNAL MEDICINE | Admitting: INTERNAL MEDICINE
Payer: COMMERCIAL

## 2025-07-14 ENCOUNTER — ANESTHESIA EVENT (OUTPATIENT)
Facility: HOSPITAL | Age: 55
End: 2025-07-14
Payer: COMMERCIAL

## 2025-07-14 ENCOUNTER — ANESTHESIA (OUTPATIENT)
Facility: HOSPITAL | Age: 55
End: 2025-07-14
Payer: COMMERCIAL

## 2025-07-14 VITALS
WEIGHT: 173.5 LBS | OXYGEN SATURATION: 100 % | TEMPERATURE: 97.2 F | BODY MASS INDEX: 29.62 KG/M2 | SYSTOLIC BLOOD PRESSURE: 103 MMHG | HEIGHT: 64 IN | HEART RATE: 68 BPM | DIASTOLIC BLOOD PRESSURE: 65 MMHG | RESPIRATION RATE: 16 BRPM

## 2025-07-14 PROCEDURE — 3600007502: Performed by: INTERNAL MEDICINE

## 2025-07-14 PROCEDURE — 3700000001 HC ADD 15 MINUTES (ANESTHESIA): Performed by: INTERNAL MEDICINE

## 2025-07-14 PROCEDURE — 6360000002 HC RX W HCPCS: Performed by: NURSE ANESTHETIST, CERTIFIED REGISTERED

## 2025-07-14 PROCEDURE — 7100000011 HC PHASE II RECOVERY - ADDTL 15 MIN: Performed by: INTERNAL MEDICINE

## 2025-07-14 PROCEDURE — 7100000010 HC PHASE II RECOVERY - FIRST 15 MIN: Performed by: INTERNAL MEDICINE

## 2025-07-14 PROCEDURE — 2580000003 HC RX 258: Performed by: NURSE ANESTHETIST, CERTIFIED REGISTERED

## 2025-07-14 PROCEDURE — 3700000000 HC ANESTHESIA ATTENDED CARE: Performed by: INTERNAL MEDICINE

## 2025-07-14 PROCEDURE — 3600007512: Performed by: INTERNAL MEDICINE

## 2025-07-14 RX ORDER — SODIUM CHLORIDE 0.9 % (FLUSH) 0.9 %
5-40 SYRINGE (ML) INJECTION EVERY 12 HOURS SCHEDULED
Status: CANCELLED | OUTPATIENT
Start: 2025-07-14

## 2025-07-14 RX ORDER — SODIUM CHLORIDE 0.9 % (FLUSH) 0.9 %
5-40 SYRINGE (ML) INJECTION PRN
Status: CANCELLED | OUTPATIENT
Start: 2025-07-14

## 2025-07-14 RX ORDER — LANOLIN ALCOHOL/MO/W.PET/CERES
1000 CREAM (GRAM) TOPICAL DAILY
COMMUNITY

## 2025-07-14 RX ORDER — PROPOFOL 10 MG/ML
INJECTION, EMULSION INTRAVENOUS
Status: DISCONTINUED | OUTPATIENT
Start: 2025-07-14 | End: 2025-07-14 | Stop reason: SDUPTHER

## 2025-07-14 RX ORDER — LIDOCAINE HYDROCHLORIDE 20 MG/ML
INJECTION, SOLUTION EPIDURAL; INFILTRATION; INTRACAUDAL; PERINEURAL
Status: DISCONTINUED | OUTPATIENT
Start: 2025-07-14 | End: 2025-07-14 | Stop reason: SDUPTHER

## 2025-07-14 RX ORDER — SODIUM CHLORIDE 0.9 % (FLUSH) 0.9 %
5-40 SYRINGE (ML) INJECTION PRN
Status: DISCONTINUED | OUTPATIENT
Start: 2025-07-14 | End: 2025-07-14 | Stop reason: HOSPADM

## 2025-07-14 RX ORDER — ONDANSETRON 4 MG/1
4 TABLET, ORALLY DISINTEGRATING ORAL EVERY 8 HOURS PRN
Status: DISCONTINUED | OUTPATIENT
Start: 2025-07-14 | End: 2025-07-14 | Stop reason: HOSPADM

## 2025-07-14 RX ORDER — ONDANSETRON 2 MG/ML
4 INJECTION INTRAMUSCULAR; INTRAVENOUS EVERY 6 HOURS PRN
Status: DISCONTINUED | OUTPATIENT
Start: 2025-07-14 | End: 2025-07-14 | Stop reason: HOSPADM

## 2025-07-14 RX ORDER — SODIUM CHLORIDE 0.9 % (FLUSH) 0.9 %
5-40 SYRINGE (ML) INJECTION EVERY 12 HOURS SCHEDULED
Status: DISCONTINUED | OUTPATIENT
Start: 2025-07-14 | End: 2025-07-14 | Stop reason: HOSPADM

## 2025-07-14 RX ORDER — SODIUM CHLORIDE 9 MG/ML
25 INJECTION, SOLUTION INTRAVENOUS PRN
Status: CANCELLED | OUTPATIENT
Start: 2025-07-14

## 2025-07-14 RX ORDER — SODIUM CHLORIDE 9 MG/ML
INJECTION, SOLUTION INTRAVENOUS PRN
Status: DISCONTINUED | OUTPATIENT
Start: 2025-07-14 | End: 2025-07-14 | Stop reason: HOSPADM

## 2025-07-14 RX ORDER — 0.9 % SODIUM CHLORIDE 0.9 %
INTRAVENOUS SOLUTION INTRAVENOUS
Status: DISCONTINUED | OUTPATIENT
Start: 2025-07-14 | End: 2025-07-14 | Stop reason: SDUPTHER

## 2025-07-14 RX ADMIN — PROPOFOL 150 MCG/KG/MIN: 10 INJECTION, EMULSION INTRAVENOUS at 08:21

## 2025-07-14 RX ADMIN — PROPOFOL 50 MG: 10 INJECTION, EMULSION INTRAVENOUS at 08:22

## 2025-07-14 RX ADMIN — PROPOFOL 50 MG: 10 INJECTION, EMULSION INTRAVENOUS at 08:23

## 2025-07-14 RX ADMIN — LIDOCAINE HYDROCHLORIDE 20 MG: 20 INJECTION, SOLUTION EPIDURAL; INFILTRATION; INTRACAUDAL; PERINEURAL at 08:21

## 2025-07-14 RX ADMIN — SODIUM CHLORIDE 300 ML: 900 INJECTION, SOLUTION INTRAVENOUS at 08:35

## 2025-07-14 ASSESSMENT — PAIN SCALES - GENERAL
PAINLEVEL_OUTOF10: 0

## 2025-07-14 ASSESSMENT — PAIN - FUNCTIONAL ASSESSMENT: PAIN_FUNCTIONAL_ASSESSMENT: 0-10

## 2025-07-14 NOTE — OP NOTE
.                       [facility]  [facility]  Tess Cooley MD  [phone]      2025    Colonoscopy Procedure Note  Edwin Lynch  :  1970  [facility] Medical Record Number: [id number]    Indications:   Family history of colon cancer in 2 grandparents, variable bowel habits, index screening colonoscopy  PCP:  Susanna Martell APRN - NP  Anesthesia/Sedation: Monitored anesthesia care, see separate note  Endoscopist:  Tess Cooley MD   Complications:  None  Estimated Blood Loss:  None    Permit:  The indications, risks, benefits and alternatives were reviewed with the patient or their decision maker who was provided an opportunity to ask questions and all questions were answered.  The specific risks of colonoscopy with conscious sedation were reviewed, including but not limited to anesthetic complication, bleeding, adverse drug reaction, missed lesion, infection, IV site reactions, and intestinal perforation which would lead to the need for surgical repair.  Alternatives to colonoscopy including radiographic imaging, observation without testing, or laboratory testing were reviewed including the limitations of those alternatives.  After considering the options and having all their questions answered, the patient or their decision maker provided both verbal and written consent to proceed.        Procedure in Detail:  After obtaining informed consent, positioning of the patient in the left lateral decubitus position, and conduction of a pre-procedure pause or \"time out\" the endoscope was introduced into the anus and advanced to the terminal ileum intubated to 7 cm.  The quality of the colonic preparation was excellent.  A careful inspection was made as the colonoscope was withdrawn, findings and interventions are described below.  After cleaning the colon, the [place] Bowel Prep score: 9    Findings:   Digital rectal exam-unremarkable  Terminal

## 2025-07-14 NOTE — H&P
.Pre-Endoscopy H&P Update  Chief complaint/HPI/ROS:  The indication for the procedure, the patient's history and the patient's current medications are reviewed prior to the procedure and that data is reported on the H&P to which this document is attached.  Any significant complaints with regard to organ systems will be noted, and if not mentioned then a review of systems is not contributory.  Past Medical History:   Diagnosis Date    Depression     Fibroid, uterine     Gluten intolerance     Lichen sclerosus       Past Surgical History:   Procedure Laterality Date    ANAL FISTULOTOMY       SECTION  ,     WRIST SURGERY Right 10/16/2023    RIGHT DEQUERVAINS RELEASE performed by Nestor Jordan MD at [facility] AMBULATORY OR    WRIST SURGERY N/A 2024    LEFT DEQUERVAINS RELEASE performed by Nestor Jordan MD at [facility] AMBULATORY OR     Social   Social History     Tobacco Use    Smoking status: Former     Current packs/day: 0.00     Types: Cigarettes     Quit date: 2000     Years since quittin.5    Smokeless tobacco: Never   Substance Use Topics    Alcohol use: Yes     Alcohol/week: 2.0 standard drinks of alcohol     Types: 2 Glasses of wine per week     Comment: occasional; once a week 1-2 weekly      Family History   Problem Relation Age of Onset    Colon Cancer Maternal Grandmother     High Blood Pressure Maternal Grandmother     Osteoporosis Maternal Grandmother     Stroke Maternal Grandmother     Colon Cancer Maternal Grandfather     Osteoporosis Mother     Diabetes Father     Hypertension Father     High Blood Pressure Father     Obesity Father     Breast Cancer Paternal Aunt         early 50's, 2 occurances    Heart Disease Paternal Grandmother     Atrial Fibrillation Maternal Uncle     Diabetes Brother     Gout Brother     High Blood Pressure Brother     Obesity Brother     Obesity Maternal Cousin     Obesity Maternal Cousin       Allergies   Allergen Reactions    Codeine Nausea And

## 2025-07-14 NOTE — ANESTHESIA POSTPROCEDURE EVALUATION
Department of Anesthesiology  Postprocedure Note    Patient: Edwin Lynch  MRN: [id number]  YOB: 1970  Date of evaluation: 7/14/2025    Procedure Summary       Date: 07/14/25 Room / Location: [place] [address] / [facility] ENDOSCOPY    Anesthesia Start: 0820 Anesthesia Stop: 0838    Procedure: COLONOSCOPY (Lower GI Region) Diagnosis:       Screening for colon cancer      Family history of colon cancer      (Screening for colon cancer [Z12.11])      (Family history of colon cancer [Z80.0])    Surgeons: Tess Cooley MD Responsible Provider: Momo Curtis MD    Anesthesia Type: TIVA ASA Status: 2            Anesthesia Type: TIVA    Diamond Phase I: Diamond Score: 10    Diamond Phase II: Diamond Score: 10    Anesthesia Post Evaluation    Patient location during evaluation: PACU  Patient participation: complete - patient participated  Level of consciousness: awake  Pain score: 0  Airway patency: patent  Nausea & Vomiting: no nausea and no vomiting  Cardiovascular status: blood pressure returned to baseline  Respiratory status: acceptable  Hydration status: euvolemic  Pain management: adequate    No notable events documented.

## 2025-07-14 NOTE — ANESTHESIA PRE PROCEDURE
Department of Anesthesiology  Preprocedure Note       Name:  Edwin Lynch   Age:  54 y.o.  :  1970                                          MRN:  [id number]         Date:  2025      Surgeon: Surgeon(s):  Tess Cooley MD    Procedure: Procedure(s):  COLONOSCOPY    Medications prior to admission:   Prior to Admission medications    Medication Sig Start Date End Date Taking? Authorizing Provider   citalopram (CELEXA) 20 MG tablet Take 1 tablet by mouth daily 10/24/24  Yes Susanna Martell APRN - NP   estradiol (ESTRACE VAGINAL) 0.1 MG/GM vaginal cream 1 g/day intravaginally for 2 weeks, then 500 mg three times per week 10/24/24  Yes Susanna Martell APRN - NP   Cholecalciferol (VITAMIN D-3) 25 MCG (1000 UT) CAPS Take by mouth Daily   Yes Shay De La Cruz MD   vitamin B-12 (CYANOCOBALAMIN) 1000 MCG tablet Take 1 tablet by mouth daily    Shay De La Cruz MD   CALCIUM PO Take by mouth Daily    Shay De La Cruz MD       Current medications:    Current Facility-Administered Medications   Medication Dose Route Frequency Provider Last Rate Last Admin    sodium chloride flush 0.9 % injection 5-40 mL  5-40 mL IntraVENous 2 times per day Tess Cooley MD        sodium chloride flush 0.9 % injection 5-40 mL  5-40 mL IntraVENous PRN Tess Cooley MD        0.9 % sodium chloride infusion   IntraVENous PRN Tess Cooley MD        ondansetron (ZOFRAN-ODT) disintegrating tablet 4 mg  4 mg Oral Q8H PRN Tess Cooley MD        Or    ondansetron (ZOFRAN) injection 4 mg  4 mg IntraVENous Q6H PRN Tess Cooley MD         Current Outpatient Medications   Medication Sig Dispense Refill    citalopram (CELEXA) 20 MG tablet Take 1 tablet by mouth daily 90 tablet 3    estradiol (ESTRACE VAGINAL) 0.1 MG/GM vaginal cream 1 g/day intravaginally for 2 weeks, then 500 mg three times per week 42.5 g 0    Cholecalciferol (VITAMIN D-3) 25 MCG (1000 UT) CAPS Take by mouth Daily      vitamin B-12 (CYANOCOBALAMIN) 1000 MCG

## 2025-08-08 ENCOUNTER — OFFICE VISIT (OUTPATIENT)
Age: 55
End: 2025-08-08

## 2025-08-08 VITALS
SYSTOLIC BLOOD PRESSURE: 122 MMHG | OXYGEN SATURATION: 99 % | HEART RATE: 71 BPM | HEIGHT: 64 IN | WEIGHT: 177.4 LBS | BODY MASS INDEX: 30.29 KG/M2 | DIASTOLIC BLOOD PRESSURE: 80 MMHG

## 2025-08-08 DIAGNOSIS — Z01.419 WELL WOMAN EXAM: Primary | ICD-10-CM

## 2025-08-08 DIAGNOSIS — Z12.4 CERVICAL CANCER SCREENING: ICD-10-CM

## 2025-08-08 RX ORDER — ESTRADIOL 0.1 MG/G
CREAM VAGINAL
Qty: 42.5 G | Refills: 3 | Status: SHIPPED | OUTPATIENT
Start: 2025-08-08

## 2025-08-08 SDOH — ECONOMIC STABILITY: FOOD INSECURITY: WITHIN THE PAST 12 MONTHS, THE FOOD YOU BOUGHT JUST DIDN'T LAST AND YOU DIDN'T HAVE MONEY TO GET MORE.: NEVER TRUE

## 2025-08-08 SDOH — ECONOMIC STABILITY: FOOD INSECURITY: WITHIN THE PAST 12 MONTHS, YOU WORRIED THAT YOUR FOOD WOULD RUN OUT BEFORE YOU GOT MONEY TO BUY MORE.: NEVER TRUE

## 2025-08-08 ASSESSMENT — PATIENT HEALTH QUESTIONNAIRE - PHQ9
2. FEELING DOWN, DEPRESSED OR HOPELESS: NOT AT ALL
SUM OF ALL RESPONSES TO PHQ QUESTIONS 1-9: 7
7. TROUBLE CONCENTRATING ON THINGS, SUCH AS READING THE NEWSPAPER OR WATCHING TELEVISION: SEVERAL DAYS
9. THOUGHTS THAT YOU WOULD BE BETTER OFF DEAD, OR OF HURTING YOURSELF: NOT AT ALL
5. POOR APPETITE OR OVEREATING: NOT AT ALL
10. IF YOU CHECKED OFF ANY PROBLEMS, HOW DIFFICULT HAVE THESE PROBLEMS MADE IT FOR YOU TO DO YOUR WORK, TAKE CARE OF THINGS AT HOME, OR GET ALONG WITH OTHER PEOPLE: SOMEWHAT DIFFICULT
6. FEELING BAD ABOUT YOURSELF - OR THAT YOU ARE A FAILURE OR HAVE LET YOURSELF OR YOUR FAMILY DOWN: SEVERAL DAYS
SUM OF ALL RESPONSES TO PHQ QUESTIONS 1-9: 7
1. LITTLE INTEREST OR PLEASURE IN DOING THINGS: NOT AT ALL
3. TROUBLE FALLING OR STAYING ASLEEP: MORE THAN HALF THE DAYS
SUM OF ALL RESPONSES TO PHQ QUESTIONS 1-9: 7
8. MOVING OR SPEAKING SO SLOWLY THAT OTHER PEOPLE COULD HAVE NOTICED. OR THE OPPOSITE, BEING SO FIGETY OR RESTLESS THAT YOU HAVE BEEN MOVING AROUND A LOT MORE THAN USUAL: NOT AT ALL
SUM OF ALL RESPONSES TO PHQ QUESTIONS 1-9: 7
4. FEELING TIRED OR HAVING LITTLE ENERGY: NEARLY EVERY DAY

## 2025-08-14 LAB
CYTOLOGIST CVX/VAG CYTO: NORMAL
CYTOLOGY CVX/VAG DOC CYTO: NORMAL
CYTOLOGY CVX/VAG DOC THIN PREP: NORMAL
DX ICD CODE: NORMAL
HPV GENOTYPE REFLEX: NORMAL
HPV I/H RISK 4 DNA CVX QL PROBE+SIG AMP: NEGATIVE
Lab: NORMAL
OTHER STN SPEC: NORMAL
SERVICE CMNT-IMP: NORMAL
STAT OF ADQ CVX/VAG CYTO-IMP: NORMAL

## (undated) DEVICE — GOWN,SIRUS,NONRNF,SETINSLV,2XL,18/CS: Brand: MEDLINE

## (undated) DEVICE — HAND-SFMCASU: Brand: MEDLINE INDUSTRIES, INC.

## (undated) DEVICE — GLOVE ORANGE PI 7   MSG9070

## (undated) DEVICE — GLOVE SURG SZ 75 L12IN FNGR THK79MIL GRN LTX FREE

## (undated) DEVICE — STRIP,CLOSURE,WOUND,MEDI-STRIP,1/2X4: Brand: MEDLINE

## (undated) DEVICE — ZIMMER® STERILE DISPOSABLE TOURNIQUET CUFF WITH PROTECTIVE SLEEVE AND PLC, DUAL PORT, SINGLE BLADDER, 18 IN. (46 CM)

## (undated) DEVICE — DRAPE,U/ SHT,SPLIT,PLAS,STERIL: Brand: MEDLINE

## (undated) DEVICE — SUTURE MONOCRYL SZ 3-0 L27IN ABSRB UD L19MM PS-2 3/8 CIR PRIM Y427H

## (undated) DEVICE — GLOVE SURG SZ 8 CRM LTX FREE POLYISOPRENE POLYMER BEAD ANTI

## (undated) DEVICE — TOWEL,OR,DSP,ST,BLUE,STD,2/PK,40PK/CS: Brand: MEDLINE

## (undated) DEVICE — SUTURE VCRL SZ 3-0 L27IN ABSRB UD L26MM SH 1/2 CIR J416H

## (undated) DEVICE — SUTURE VICRYL + SZ 3-0 L27IN ABSRB UD L26MM SH 1/2 CIR VCP416H

## (undated) DEVICE — BANDAGE COMPR W3INXL5YD WHT BGE POLY COT M E WRP WV HK AND

## (undated) DEVICE — WRAP COHESIVE W2INXL5YD TAN SELF ADH BNDG HND NON STERILE TEAR CARING

## (undated) DEVICE — SOLUTION IRRIG 1000ML STRL H2O USP PLAS POUR BTL

## (undated) DEVICE — GLOVE SURG SZ 85 L12IN FNGR THK79MIL GRN LTX FREE

## (undated) DEVICE — SUTURE MCRYL SZ 3-0 L27IN ABSRB UD L19MM PS-2 3/8 CIR PRIM Y427H

## (undated) DEVICE — SOLUTION IRRIG 500ML 0.9% SOD CHLO USP POUR PLAS BTL